# Patient Record
Sex: MALE | Race: WHITE | NOT HISPANIC OR LATINO | URBAN - METROPOLITAN AREA
[De-identification: names, ages, dates, MRNs, and addresses within clinical notes are randomized per-mention and may not be internally consistent; named-entity substitution may affect disease eponyms.]

---

## 2018-04-13 ENCOUNTER — OFFICE VISIT (OUTPATIENT)
Dept: FAMILY MEDICINE | Facility: CLINIC | Age: 28
End: 2018-04-13
Payer: COMMERCIAL

## 2018-04-13 DIAGNOSIS — F64.0 GENDER DYSPHORIA IN ADULT: Primary | ICD-10-CM

## 2018-04-13 LAB
% GRANULOCYTES: 70.5 %G (ref 40–75)
ALBUMIN SERPL-MCNC: 4.8 MG/DL (ref 3.8–5)
ALP SERPL-CCNC: 65.2 U/L (ref 31.7–110.5)
ALT SERPL-CCNC: 16.8 U/L (ref 0–45)
AST SERPL-CCNC: 12.6 U/L (ref 0–45)
BILIRUB SERPL-MCNC: <0.4 MG/DL (ref 0.2–1.3)
BILIRUBIN DIRECT: 0.1 MG/DL (ref 0.1–0.3)
CHOLEST SERPL-MCNC: 169 MG/DL (ref 0–200)
CHOLEST/HDLC SERPL: 2.5 {RATIO} (ref 0–5)
GLUCOSE SERPL-MCNC: 103 MG'DL (ref 70–99)
GRANULOCYTES #: 4.4 K/UL (ref 1.6–8.3)
HCT VFR BLD AUTO: 42 % (ref 35–47)
HDLC SERPL-MCNC: 68.8 MG/DL
HEMOGLOBIN: 13.3 G/DL (ref 11.7–15.7)
LDLC SERPL CALC-MCNC: 93 MG/DL (ref 0–129)
LYMPHOCYTES # BLD AUTO: 1.5 K/UL (ref 0.8–5.3)
LYMPHOCYTES NFR BLD AUTO: 23.4 %L (ref 20–48)
MCH RBC QN AUTO: 31.1 PG (ref 26.5–35)
MCHC RBC AUTO-ENTMCNC: 31.7 G/DL (ref 32–36)
MCV RBC AUTO: 98.1 FL (ref 78–100)
MID #: 0.4 K/UL (ref 0–2.2)
MID %: 6.1 %M (ref 0–20)
PLATELET # BLD AUTO: 337 K/UL (ref 150–450)
PROT SERPL-MCNC: 7.2 G/DL (ref 6.8–8.8)
RBC # BLD AUTO: 4.28 M/UL (ref 3.8–5.2)
TRIGL SERPL-MCNC: 36 MG/DL (ref 0–150)
VLDL CHOLESTEROL: 7.2 MG/DL (ref 7–32)
WBC # BLD AUTO: 6.3 K/UL (ref 4–11)

## 2018-04-13 NOTE — PROGRESS NOTES
"    Transgender History Intake:       ERIC Walker is a 28 year old individual  who presents today for interest in masculinizing hormone therapy to better align their body with their gender identity.  Came to this clinic via referral from: word of mouth, [facility] waitlist too long    Living with a roommate, moved to [facility], [employer] 2017   at [employer], some gym      1-How do you identify? Between nonbinary and transmasculine     On M (0) to F (10) scale, put self about a 6  Not sure how transmasculine he is, but definitely knows he wants to be on testosterone.     2. What pronouns do you prefer?  He/Him/His/Himself   3-What age did you first feel your gender identity (internal sense of gender) did not match your physical body?     Came out as a lesbian around age 15. Stepmother asked at that time if he wanted to be a boy, and he was offended by this. Had a trans roomate in college and would \"check in\" yearly to see if he felt trans. Previously had longer hair, trying to fit in feminine box. Cut hair a few years ago, afraid would look like a boy, or afraid would discover he was trans. Three weeks ago thought to himself \"It would be easier if I was born a boy\", this was somewhat of a alban moment. For the past 3 weeks has come out to almost everyone as Mo (he/him)    4-Have you ever felt depressed or suicidal because your gender identity and body don't match?     Yes. This week has been hard.   Part of struggle has been trying to fit into the lesbian community.   Similar feelings at age 15, had more concrete suicide ideas at that time  No suicidal ideas or plans - just a feeling that if he was dead it would be easier.No history of plans or suicide attempts.     5-Have you legally changed your name? no   If yes: prior name for DIANE:   Gender marker on ID's?   no  6-Have you ever seen a health care provider about being transgender?No  7-What hormones have you been on and for how " long? (These can be treatments you were prescribed, that you got from a friend or bought without a prescription. Include any treatments you currently take.) None  8-Ever had any problems with hormone treatment?  N/A  9-If not on hormones, would you like to be?   maybe  What are your goals for hormone therapy ? Facial hair, body hair, fat redistribution, face contour, voice  10-Have you had any gender affirmation surgery? No  11-Do you want to have surgery now or in future?  YES top surgery for sure, unsure about bottom surgery  12-Are you out at work or at school or at home?      Entire family except grandparents (plans to email them), friends, roommate, instagram, twitter, not FB. 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 none  14-What else we should know about you?  none    ----------      Past Surgical History:   Procedure Laterality Date     ENT SURGERY         There is no problem list on file for this patient.    Past Medical History:   Diagnosis Date     Depressive disorder        Current Outpatient Prescriptions   Medication Sig Dispense Refill     Acetaminophen (TYLENOL PO) Take 325 mg by mouth as needed for mild pain or fever         Family History   Problem Relation Age of Onset     DIABETES Mother      Coronary Artery Disease Father      Breast Cancer Maternal Grandmother      Hypertension No family hx of      Hyperlipidemia No family hx of      CEREBROVASCULAR DISEASE No family hx of        Allergies   Allergen Reactions     Cats      Sneezing and watery eyes     Omnicef [Cefdinir] Hives     SOB. Swelling of face     Penicillins Hives       History   Smoking Status     Never Smoker   Smokeless Tobacco     Never Used       Mental Health Assessment:  Non gender related Therapist? Therapist: [facility] at [facility] in [facility], last 2-3 months - discuss gender and depression  Chemical use history THC use  Mental Health diagnosis  history MDD  Medications  prescribed for above and by whom? meds in high school,  didn't feel like it helped, just felt lethargic            Review of Systems:   CONSTITUTIONAL: NEGATIVE for fever, chills, change in weight  INTEGUMENTARY/SKIN: NEGATIVE for worrisome rashes, moles or lesions  EYES: NEGATIVE for vision changes or irritation  ENT/MOUTH: NEGATIVE for ear, mouth and throat problems  RESP: allergic cough from cat and marijuana (once daily)  BREAST: NEGATIVE for masses, tenderness or discharge  CV: NEGATIVE for chest pain, palpitations or peripheral edema  GI: NEGATIVE for nausea, heartburn, or change in bowel habits.  Stress induced low appetite and abodminal pain  : NEGATIVE for frequency, dysuria, or hematuria. Not seen a GYN in many years, has PP appt next week. Varying degress of discharge, intermittent malodor, no itching  MUSCULOSKELETAL: NEGATIVE for significant arthralgias or myalgia  NEURO: NEGATIVE for weakness, dizziness or paresthesias  ENDOCRINE: NEGATIVE for temperature intolerance, skin/hair changes  HEME/ALLERGY: NEGATIVE for bleeding problems  PSYCHIATRIC: NEGATIVE for changes in mood or affect  Tired all the time - believes it is depression related         Social History     Social History     Social History     Marital status: Single     Spouse name: N/A     Number of children: N/A     Years of education: N/A     Social History Main Topics     Smoking status: Never Smoker     Smokeless tobacco: Never Used     Alcohol use Yes      Comment: occasionally     Drug use: Yes     Special: Marijuana     Sexual activity: Yes     Partners: Female     Birth control/ protection: None     Alcohol <1 per week  No tobacco  Daily marijuana use    Marital Status: Single  Who lives in your household? Roommate Avelino    Only child, all family is in [facility]    Has anyone hurt you physically, for example by pushing, hitting, slapping or kicking you or forcing you to have sex? Denies  Do you feel threatened or controlled by a  "partner, ex-partner or anyone in your life? Denies    Is an only child. Mother/Stepfather live in [place], [place]. Father lives in [place]. When father was previously , he had stepsiblings, but now they are  and he has no contact with them.     Sexual Health     Sexual concerns:  No   History of sexual abuse:  No  STI History:   Neg  Pregnancy History:  No obstetric history on file.  Last Pap Smear :  Believes had a pap, but was a long time ago. [facility]   Abnormal Pap Hx:  None    Sexual health and relationships:  Current sexual partners: girlfriend with vagina     Past sexual partners:    \"All biological women\"  No contact with sperm havers         Physical Exam:     LMP 04/05/2018 (Exact Date) BMI= There is no height or weight on file to calculate BMI.   Appearance: male appearance and dress  GENERAL:: healthy, alert and no distress  RESP: lungs clear to auscultation - no rales, no rhonchi, no wheezes  CV: regular rates and rhythm, normal S1 S2, no S3 or S4 and no murmur, no click or rub -  ABDOMEN: soft, no tenderne  ss, no  hepatosplenomegaly, no masses, normal bowel sounds  Psych: Alert and oriented times 3; coherent speech, normal rate and volume, able to articulate logical thoughts, able to abstract reason, no tangential thoughts, no hallucinations or delusions. Affect is bright. Passive suicidal ideation, no plans, contracts for safety, trans crisis numbers provided    Assessment and Plan   Denise was seen today for establish care.    Diagnoses and all orders for this visit:    Gender dysphoria in adult  -     Testosterone Total  -     CBC with Diff Plt  -     Hepatic Panel  -     Lipid Cascade  -     Glucose       Gender nonbinary  Female sex assigned at birth.  Struggles with leaving lesbian community behind as/if he transitions to be more masculine.  Does not necessarily want to start hormones right away, more in exploratory phase.     Today s visit " included assessment of interventions to alleviate symptoms related to gender dysphoria or gender nonconformity, including     psychological support    medical treatment (hormones or blockers)    options for social support or changes in gender expression.   Hormones can be provided in 3-6 months IF:     Patient able to provide informed consent     Likely to take hormones in a responsible manner    Discussed physical effects, benefits, and risk assessment & modification    Discussed the clinical and biochemical monitoring of hormonal changes and the potential impact on reproductive health (see smiavsconsent)    Stable mental health. Transgender patients are at higher risk of suicide. This patient has been assessed for suicide risk.  Oriented to overall gender assessment and hormone start process, may be 3-6 months before hormones start, need for u0ehlta visits then q3mo, then q6mo    (This assessment is based on the 2011 published Standards of Care for the Health of Transsexual, Transgender, and Gender-Nonconforming People, Version 7, by the World Professional Association of Transgender Health. WPATH SOC Guidelines)    I spent 45 min face to face with the patient and >50% was spent counselling the patient about the above medical conditions, educating patient and discussing the recommendations and followup.    Ema Arechiga MD  [facility], [place]

## 2018-04-13 NOTE — PATIENT INSTRUCTIONS
Here is the plan from today's visit    1. Gender dysphoria in adult  - Testosterone Total  - CBC with Diff Plt  - Hepatic Panel  - Lipid Cascade  - Glucose    Follow up plan  Please make a clinic appointment for follow up with me (EMA MEDEIROS) for ongoing gender and transition visits.     Thank you for coming to [facility] today.  Lab Testing:  **If you had lab testing today and your results are reassuring or normal they will be mailed to you or sent through [facility] within 7 days.   **If the lab tests need quick action we will call you with the results.  The phone number we will call with results is # [phone] (home) . If this is not the best number please call our clinic and change the number.  Medication Refills:  If you need any refills please call your pharmacy and they will contact us.   If you need to  your refill at a new pharmacy, please contact the new pharmacy directly. The new pharmacy will help you get your medications transferred faster.   Scheduling:  If you have any concerns about today's visit or wish to schedule another appointment please call our office during normal business hours [phone] (8-5:00 M-F)  If a referral was made to a [facility] and you don't get a call from central scheduling please call [phone].  If a Mammogram was ordered for you at [facility] call [phone] to schedule or change your appointment.  If you had an XRay/CT/Ultrasound/MRI ordered the number is [phone] to schedule or change your radiology appointment.   Medical Concerns:  If you have urgent medical concerns please call [phone] at any time of the day.    Ema Medeiros MD          Informed Consent   Testosterone Therapy       This form refers to the use of testosterone by persons in the female-to-male spectrum who wish to become more masculine to reduce gender dysphoria and facilitate a more masculine gender presentation. While there are  "risks associated with taking testosterone, when appropriately prescribed it can greatly improve mental health and quality of life.     Please seek another opinion if you want additional perspective on any aspect of your care.       Masculinizing Effects     1. I understand that testosterone may be prescribed to reduce female physical characteristics and masculinize my body.     2. I understand that the masculinizing effects of testosterone can take several months to a year to become noticeable, that the rate and degree of change can't be predicted, and that changes may not be complete for 2-5 years after I start testosterone.     3. I understand that these changes will likely be permanent even if I stop taking testosterone:    ? Lower voice pitch (i.e., voice becoming deeper).   ? Increased growth of hair, with thicker/coarser hairs, on arms, legs, chest, back, and abdomen.   ? Gradual growth of moustache/facial hair.   ? Hair loss at the temples and crown of the head, with the possibility of becoming completely bald.   ? Genital changes may or may not be permanent if testosterone is stopped. These include clitoral growth (typically 1-3 cm) and vaginal dryness.     4. I understand that the following changes are usually not permanent (that is, they will likely reverse if I stop taking testosterone). Although testosterone does not change these features, there are other treatments that may be helpful:    ? Acne, which may be severe and can cause permanent scarring if not treated.   ? Fat may redistribute to a more masculine pattern (decreased on buttocks/hips/thighs, increased in abdomen - changing from \"pear shape\" to \"apple shape\").   ? Increased muscle mass and upper body strength.   ? Increased libido (sex drive).   ? Menstrual periods typically stop within 3-6 months of starting testosterone.   5. I understand that it is not known what the effects of testosterone are on fertility. Fertility is reduced and I may " never be able to get pregnant, even if I stop testosterone. On the other hand, even if my period stops, it may still be possible for me to get pregnant, and I am aware of birth control options (if applicable). I have been informed that I can't take testosterone if I am pregnant.     6. I understand that there are some aspects of my body that will not be changed by testosterone:   ? Breasts may appear slightly smaller due to fat loss, but will not substantially shrink   ? Although voice pitch will likely drop, other aspects of speech will not become more masculine.       Risks of Testosterone     7. I understand that the medical effects and safety of testosterone are not fully understood, and that there may be long-term risks that are not yet known.     8. I understand that I am strongly advised not to take more testosterone than I am prescribed, as this increases health risks. I have been informed that taking more will not make masculinization happen more quickly or increase the degree of change.     9. I understand that testosterone can cause changes that increase my risk of heart disease, including:     ? Decreasing good cholesterol (HDL) and increasing bad cholesterol (LDL)   ? Increasing blood pressure   ? Increasing deposits of fat around my internal organ    I have been advised that my risks of heart disease are greater if people in my family have had heart disease, if I am overweight, or if I smoke.   I have been advised that heart health checkups, including monitoring of my weight and cholesterol levels, should be done periodically as long as I am taking testosterone.     10. I understand that testosterone can damage the liver, possibly leading to liver disease. I have been advised that I should be monitored for as long as I am taking testosterone.     11. I understand that testosterone can increase the red blood cells and hemoglobin, and while the increase is usually only to a normal male range (which does  not pose health risks), a high increase can cause potentially life-threatening problems such as stroke and heart attack. I have been advised that my blood should be monitored periodically while I am taking testosterone.     12. I understand that taking testosterone can increase my risk for diabetes by decreasing my body's response to insulin, causing weight gain, and increasing deposits of fat around my internal organs. I have been advised that my fasting blood glucose should be monitored periodically while I am taking testosterone.       13. I understand that it is not known if testosterone increases the risk of ovarian, breast, or uterine cancer.     14. I understand that taking testosterone can lead to my cervix and the walls of my vagina becoming more fragile, and that this can lead to tears or abrasions that increase the risk of sexually transmitted infections (including HIV) if I have vaginal sex - no matter what the gender of my partner is. I have been advised that phi discussion with my doctor about my sexual practices can help determine how best to prevent and monitor for sexually transmitted infections.     15. I have been informed that testosterone can cause headaches or migraines. I understand that if I am frequently having headaches or migraines, or the pain is unusually severe, it is recommended that I talk with my health care provider.     16. I understand that testosterone can cause emotional changes, including increased irritability, frustration, and anger. I have been advised that my doctor can assist me in finding resources to explore and cope with these changes.     17. I understand that testosterone will result in changes that will be noticeable by other people, and that some transgender people in similar circumstances have experienced harassment, discrimination, and violence, while others have lost support of loved ones. I have been advised that my doctor can assist me in finding advocacy and  support resources.     Prevention of Medical Complications       18. I agree to take testosterone as prescribed and to tell my doctor if I am not happy with the treatment or am experiencing any problems.     19. I understand that the right dose or type of medication prescribed for me may not be the same as for someone else.     20. I understand that physical examinations and blood tests are needed on a regular basis to check for negative side effects of testosterone.     21. I understand that testosterone can interact with other medication (including other sources of hormones), dietary supplements, herbs, alcohol, and street drugs. I understand that being honest with my doctor about what else I am taking will help prevent medical complications that could be life-threatening. I have been informed that I will continue to get medical care no matter what information I share.     22. I understand that some medical conditions make it dangerous to take testosterone. I agree that I will be checked for risky conditions before the decision to take testosterone is made.     23. I understand that I can choose to stop taking testosterone at any time, and that it is advised that I do this with the help of my doctor to make sure there are no negative reactions to stopping. I understand that my doctor may suggest I reduce or stop taking testosterone if there are severe side effects or health risks that can't be controlled.         My signature below confirms that:       ? My doctor has talked with me about the benefits and risks of testosterone, the possible or likely consequences of hormone therapy, and potential alternative treatment options.   ? I understand the risks that may be involved.   ? I understand that this form covers known effects and risks and that there may be long-term effects or risks that are not yet known  ? I have had sufficient opportunity to discuss treatment options with my doctor. All of my questions have  been answered to my satisfaction.   ? I believe I have adequate knowledge on which to base informed consent to the provision of testosterone therapy.     Based on this:   _____ I wish to begin taking testosterone.     _____ I do not wish to begin taking testosterone at this time.         Whatever your current decision is, please talk with your doctor any time you have questions, concerns, or want to re-evaluate your options.         ____________________________________ __________________   Patient Signature     Date           ____________________________________ __________________   Prescribing clinician Signature    Date         Some online resources for transgender health    [place] [facility]gender [facility]   Home to the [facility] at [address] [place] [address], [phone]. Also has support groups.  http://www.[place]transhealth.org/    [facility] for Transgender Health  Increasing access to comprehensive, effective, and affirming healthcare services for trans and gender-variant communities.  http://www.transhealth.[facility].edu/trans?page=dakota-00-05    [facility]   Community-based non-profit committed to advancing the health & wellness of LGBTQ communities through research, education and advocacy. http://www.[facility].org    [facility] Glossary of Transgender Terms  http://www.[facility].org/site/DocServer/Handout_7-C_Glossary_of_Gender_and_Transgender_Terms__fi.pdf?tcbVW=3617    Safe, gender-neutral public restrooms in the [facility]   http://www.[place]transhealth.org//index.php?option=com_content&task=view&id=12&Itemid    Trans Youth Support Network  For people 26 and under who identify as a trans or gender non-conforming person and want to be a part of an activist organization. Offers peer support, education and community building opportunities.   http://www.transyouthsupportnetwork.org/    Reclaim:  RECLAIM offers mental and integrative health services for LGBTQ youth and their  families.  http://www.reclaim-lgbtyouth.org/    Gender Spectrum, for Trans Youth  Gender Spectrum provides education, training and support to help create a gender sensitive and inclusive environment for all children and teens. http://www.genderspectrum.org/    [facility] FTM Resource Guide  Information on topics of interest to female-to-male (FTM, F2M) trans men, and their friends and loved ones.  http://ftmguide.org/    Also check out your local [employer] resource center!  LGBTQIA Services at [facility]: http://www.[facility]/lgbtqia/  LGBTQ@[facility]: http://www.[facility]/multiculturallife/lgbtq/  GLBTA Programs office at [facility]: https://diversity.[facility].edu/glbta/    Effects and Expected Time Course of Masculinizing  Hormones    Effect Expected Onset Expected Maximum Effect   Skin oiliness/Acne 1-6 months 1-2 years   Facial/body hair growth 3-6 months 3-5 years    Scalp hair loss >12 months+ Variable   Increased muscle mass/strength 6-12 months 2-5 years   Body fat redistribution 3-6 months 2-5 years   Cessation of menses 2-6 months n/a   Clitoral enlargement 3-6 months 1-2 years   Vaginal atrophy 3-6 months 1-2 years   Deepened voice 3-12 months 1-2 years           Thoughts of self harm?   Trans Lifeline can be reached at [phone].   This service is staffed by trans people 24/7.   For LGBT youth (ages 24 and younger) contemplating suicide, the [facility] Lifeline can be reached at [phone].

## 2018-04-13 NOTE — MR AVS SNAPSHOT
After Visit Summary   4/13/2018    Denise Lopez    MRN: [id number]           Patient Information     Date Of Birth          1990        Visit Information        Provider Department      4/13/2018 8:00 AM Ema Medeiros MD [facility]        Today's Diagnoses     Gender dysphoria in adult    -  1      Care Instructions      Here is the plan from today's visit    1. Gender dysphoria in adult  - Testosterone Total  - CBC with Diff Plt  - Hepatic Panel  - Lipid Cascade  - Glucose    Follow up plan  Please make a clinic appointment for follow up with me (EMA MEDEIROS) for ongoing gender and transition visits.     Thank you for coming to [facility] today.  Lab Testing:  **If you had lab testing today and your results are reassuring or normal they will be mailed to you or sent through Thryve within 7 days.   **If the lab tests need quick action we will call you with the results.  The phone number we will call with results is # [phone] (home) . If this is not the best number please call our clinic and change the number.  Medication Refills:  If you need any refills please call your pharmacy and they will contact us.   If you need to  your refill at a new pharmacy, please contact the new pharmacy directly. The new pharmacy will help you get your medications transferred faster.   Scheduling:  If you have any concerns about today's visit or wish to schedule another appointment please call our office during normal business hours [phone] (8-5:00 M-F)  If a referral was made to a [facility] and you don't get a call from central scheduling please call [phone].  If a Mammogram was ordered for you at [facility] call [phone] to schedule or change your appointment.  If you had an XRay/CT/Ultrasound/MRI ordered the number is [phone] to schedule or change your radiology appointment.   Medical Concerns:  If you have  urgent medical concerns please call [phone] at any time of the day.    Ema Arechiga MD          Informed Consent   Testosterone Therapy       This form refers to the use of testosterone by persons in the female-to-male spectrum who wish to become more masculine to reduce gender dysphoria and facilitate a more masculine gender presentation. While there are risks associated with taking testosterone, when appropriately prescribed it can greatly improve mental health and quality of life.     Please seek another opinion if you want additional perspective on any aspect of your care.       Masculinizing Effects     1. I understand that testosterone may be prescribed to reduce female physical characteristics and masculinize my body.     2. I understand that the masculinizing effects of testosterone can take several months to a year to become noticeable, that the rate and degree of change can't be predicted, and that changes may not be complete for 2-5 years after I start testosterone.     3. I understand that these changes will likely be permanent even if I stop taking testosterone:    ? Lower voice pitch (i.e., voice becoming deeper).   ? Increased growth of hair, with thicker/coarser hairs, on arms, legs, chest, back, and abdomen.   ? Gradual growth of moustache/facial hair.   ? Hair loss at the temples and crown of the head, with the possibility of becoming completely bald.   ? Genital changes may or may not be permanent if testosterone is stopped. These include clitoral growth (typically 1-3 cm) and vaginal dryness.     4. I understand that the following changes are usually not permanent (that is, they will likely reverse if I stop taking testosterone). Although testosterone does not change these features, there are other treatments that may be helpful:    ? Acne, which may be severe and can cause permanent scarring if not treated.   ? Fat may redistribute to a more masculine pattern (decreased on  "buttocks/hips/thighs, increased in abdomen - changing from \"pear shape\" to \"apple shape\").   ? Increased muscle mass and upper body strength.   ? Increased libido (sex drive).   ? Menstrual periods typically stop within 3-6 months of starting testosterone.   5. I understand that it is not known what the effects of testosterone are on fertility. Fertility is reduced and I may never be able to get pregnant, even if I stop testosterone. On the other hand, even if my period stops, it may still be possible for me to get pregnant, and I am aware of birth control options (if applicable). I have been informed that I can't take testosterone if I am pregnant.     6. I understand that there are some aspects of my body that will not be changed by testosterone:   ? Breasts may appear slightly smaller due to fat loss, but will not substantially shrink   ? Although voice pitch will likely drop, other aspects of speech will not become more masculine.       Risks of Testosterone     7. I understand that the medical effects and safety of testosterone are not fully understood, and that there may be long-term risks that are not yet known.     8. I understand that I am strongly advised not to take more testosterone than I am prescribed, as this increases health risks. I have been informed that taking more will not make masculinization happen more quickly or increase the degree of change.     9. I understand that testosterone can cause changes that increase my risk of heart disease, including:     ? Decreasing good cholesterol (HDL) and increasing bad cholesterol (LDL)   ? Increasing blood pressure   ? Increasing deposits of fat around my internal organ    I have been advised that my risks of heart disease are greater if people in my family have had heart disease, if I am overweight, or if I smoke.   I have been advised that heart health checkups, including monitoring of my weight and cholesterol levels, should be done periodically as " long as I am taking testosterone.     10. I understand that testosterone can damage the liver, possibly leading to liver disease. I have been advised that I should be monitored for as long as I am taking testosterone.     11. I understand that testosterone can increase the red blood cells and hemoglobin, and while the increase is usually only to a normal male range (which does not pose health risks), a high increase can cause potentially life-threatening problems such as stroke and heart attack. I have been advised that my blood should be monitored periodically while I am taking testosterone.     12. I understand that taking testosterone can increase my risk for diabetes by decreasing my body's response to insulin, causing weight gain, and increasing deposits of fat around my internal organs. I have been advised that my fasting blood glucose should be monitored periodically while I am taking testosterone.       13. I understand that it is not known if testosterone increases the risk of ovarian, breast, or uterine cancer.     14. I understand that taking testosterone can lead to my cervix and the walls of my vagina becoming more fragile, and that this can lead to tears or abrasions that increase the risk of sexually transmitted infections (including HIV) if I have vaginal sex - no matter what the gender of my partner is. I have been advised that phi discussion with my doctor about my sexual practices can help determine how best to prevent and monitor for sexually transmitted infections.     15. I have been informed that testosterone can cause headaches or migraines. I understand that if I am frequently having headaches or migraines, or the pain is unusually severe, it is recommended that I talk with my health care provider.     16. I understand that testosterone can cause emotional changes, including increased irritability, frustration, and anger. I have been advised that my doctor can assist me in finding  resources to explore and cope with these changes.     17. I understand that testosterone will result in changes that will be noticeable by other people, and that some transgender people in similar circumstances have experienced harassment, discrimination, and violence, while others have lost support of loved ones. I have been advised that my doctor can assist me in finding advocacy and support resources.     Prevention of Medical Complications       18. I agree to take testosterone as prescribed and to tell my doctor if I am not happy with the treatment or am experiencing any problems.     19. I understand that the right dose or type of medication prescribed for me may not be the same as for someone else.     20. I understand that physical examinations and blood tests are needed on a regular basis to check for negative side effects of testosterone.     21. I understand that testosterone can interact with other medication (including other sources of hormones), dietary supplements, herbs, alcohol, and street drugs. I understand that being honest with my doctor about what else I am taking will help prevent medical complications that could be life-threatening. I have been informed that I will continue to get medical care no matter what information I share.     22. I understand that some medical conditions make it dangerous to take testosterone. I agree that I will be checked for risky conditions before the decision to take testosterone is made.     23. I understand that I can choose to stop taking testosterone at any time, and that it is advised that I do this with the help of my doctor to make sure there are no negative reactions to stopping. I understand that my doctor may suggest I reduce or stop taking testosterone if there are severe side effects or health risks that can't be controlled.         My signature below confirms that:       ? My doctor has talked with me about the benefits and risks of testosterone, the  possible or likely consequences of hormone therapy, and potential alternative treatment options.   ? I understand the risks that may be involved.   ? I understand that this form covers known effects and risks and that there may be long-term effects or risks that are not yet known  ? I have had sufficient opportunity to discuss treatment options with my doctor. All of my questions have been answered to my satisfaction.   ? I believe I have adequate knowledge on which to base informed consent to the provision of testosterone therapy.     Based on this:   _____ I wish to begin taking testosterone.     _____ I do not wish to begin taking testosterone at this time.         Whatever your current decision is, please talk with your doctor any time you have questions, concerns, or want to re-evaluate your options.         ____________________________________ __________________   Patient Signature     Date           ____________________________________ __________________   Prescribing clinician Signature    Date         Some online resources for transgender health    [place] Transgender Health Coalition   Home to the [facility] at [address] [place] [address], [phone]. Also has support groups.  http://www.[place]transhealth.org/    [facility] for Transgender Health  Increasing access to comprehensive, effective, and affirming healthcare services for trans and gender-variant communities.  http://www.transhealth.[facility].edu/trans?page=dakota-00-05    [facility]   Community-based non-profit committed to advancing the health & wellness of LGBTQ communities through research, education and advocacy. http://www.[employer]Mixhealth.org    [facility] Glossary of Transgender Terms  http://www.[facility].org/site/DocServer/Handout_7-C_Glossary_of_Gender_and_Transgender_Terms__fi.pdf?eyhPV=9414    Safe, gender-neutral public restrooms in the [facility]    http://www.[place]transhealth.org//index.php?option=com_content&task=view&id=12&Itemid    Trans Youth Support Network  For people 26 and under who identify as a trans or gender non-conforming person and want to be a part of an activist organization. Offers peer support, education and community building opportunities.   http://www.transyouthsupportnetwork.org/    Reclaim:  [facility] offers mental and integrative health services for LGBTQ youth and their families.  http://www.reclaim-lgbtyouth.org/    Gender Spectrum, for Trans Youth  Gender Spectrum provides education, training and support to help create a gender sensitive and inclusive environment for all children and teens. http://www.genderspectrum.org/    [employer] FTM Resource Guide  Information on topics of interest to female-to-male (FTM, F2M) trans men, and their friends and loved ones.  http://ftTrice Imaginguide.org/    Also check out your local [employer] resource center!  LGBTQIA Services at [facility]: http://www.[facility]/lgbtqia/  LGBTQ@Mac at [facility]: http://www.[facility]/multiculturallife/lgbtq/  GLBTA Programs office at [facility]: https://diversity.[facility].edu/glbta/    Effects and Expected Time Course of Masculinizing  Hormones    Effect Expected Onset Expected Maximum Effect   Skin oiliness/Acne 1-6 months 1-2 years   Facial/body hair growth 3-6 months 3-5 years    Scalp hair loss >12 months+ Variable   Increased muscle mass/strength 6-12 months 2-5 years   Body fat redistribution 3-6 months 2-5 years   Cessation of menses 2-6 months n/a   Clitoral enlargement 3-6 months 1-2 years   Vaginal atrophy 3-6 months 1-2 years   Deepened voice 3-12 months 1-2 years           Thoughts of self harm?   Trans Lifeline can be reached at [phone].   This service is staffed by trans people 24/7.   For LGBT youth (ages 24 and younger) contemplating suicide, the [employer] [facility] Lifeline can be reached at [phone].               Follow-ups after your visit        Who to  contact     Please call your clinic at [phone] to:    Ask questions about your health    Make or cancel appointments    Discuss your medicines    Learn about your test results    Speak to your doctor            Additional Information About Your Visit        JobSlothart Information     Lalalama is an electronic gateway that provides easy, online access to your medical records. With Lalalama, you can request a clinic appointment, read your test results, renew a prescription or communicate with your care team.     To sign up for Lalalama visit the website at [email or web address]   You will be asked to enter the access code listed below, as well as some personal information. Please follow the directions to create your username and password.     Your access code is: [email or web address]  Expires: 2018  8:52 AM     Your access code will  in 90 days. If you need help or a new code, please contact your [facility] or call [phone] for assistance.        Care EveryWhere ID     This is your Care EveryWhere ID. This could be used by other organizations to access your [place] medical records  [id number]        Your Vitals Were     Last Period                   2018 (Exact Date)            Blood Pressure from Last 3 Encounters:   No data found for BP    Weight from Last 3 Encounters:   No data found for Wt              We Performed the Following     CBC with Diff Plt     Glucose     Hepatic Panel     Lipid Cascade     Testosterone Total        Primary Care Provider Fax #    Physician No Ref-Primary [phone]       No address on file        Equal Access to Services     JOSELIN MENJIVAR : Hadii maryana Fonseca, waaxda newadaha, qaybta kaalmada susie, sienna brito . So [facility] [place] [phone].    ATENCIÓN: Si habla español, tiene a contreras disposición servicios gratuitos de asistencia lingüística. Llame al [phone].    We comply with applicable  federal civil rights laws and [place] laws. We do not discriminate on the basis of race, color, national origin, age, disability, sex, sexual orientation, or gender identity.            Thank you!     Thank you for choosing [facility]  for your care. Our goal is always to provide you with excellent care. Hearing back from our patients is one way we can continue to improve our services. Please take a few minutes to complete the written survey that you may receive in the mail after your visit with us. Thank you!             Your Updated Medication List - Protect others around you: Learn how to safely use, store and throw away your medicines at www.disposemymeds.org.          This list is accurate as of 4/13/18  8:52 AM.  Always use your most recent med list.                   Brand Name Dispense Instructions for use Diagnosis    TYLENOL PO      Take 325 mg by mouth as needed for mild pain or fever

## 2018-04-14 ENCOUNTER — HEALTH MAINTENANCE LETTER (OUTPATIENT)
Age: 28
End: 2018-04-14

## 2018-04-14 ASSESSMENT — PATIENT HEALTH QUESTIONNAIRE - PHQ9: SUM OF ALL RESPONSES TO PHQ QUESTIONS 1-9: 18

## 2018-04-17 ENCOUNTER — OFFICE VISIT (OUTPATIENT)
Dept: FAMILY MEDICINE | Facility: CLINIC | Age: 28
End: 2018-04-17
Payer: COMMERCIAL

## 2018-04-17 VITALS
SYSTOLIC BLOOD PRESSURE: 117 MMHG | DIASTOLIC BLOOD PRESSURE: 76 MMHG | WEIGHT: 134.8 LBS | TEMPERATURE: 98 F | OXYGEN SATURATION: 97 % | HEART RATE: 99 BPM

## 2018-04-17 DIAGNOSIS — F33.1 MODERATE EPISODE OF RECURRENT MAJOR DEPRESSIVE DISORDER (H): Primary | ICD-10-CM

## 2018-04-17 DIAGNOSIS — F64.0 GENDER DYSPHORIA IN ADULT: ICD-10-CM

## 2018-04-17 DIAGNOSIS — F41.1 GAD (GENERALIZED ANXIETY DISORDER): ICD-10-CM

## 2018-04-17 LAB
HIV1 P24 AG SER QL: NORMAL
HIV1+2 AB SERPL QL IA: NEGATIVE
RAPID HIV INTERNAL CONTROL: NORMAL
RAPID HIV INTERPRETATION: NORMAL
TESTOST SERPL-MCNC: 42 NG/DL (ref 8–60)

## 2018-04-17 ASSESSMENT — ANXIETY QUESTIONNAIRES
IF YOU CHECKED OFF ANY PROBLEMS ON THIS QUESTIONNAIRE, HOW DIFFICULT HAVE THESE PROBLEMS MADE IT FOR YOU TO DO YOUR WORK, TAKE CARE OF THINGS AT HOME, OR GET ALONG WITH OTHER PEOPLE: VERY DIFFICULT
6. BECOMING EASILY ANNOYED OR IRRITABLE: MORE THAN HALF THE DAYS
3. WORRYING TOO MUCH ABOUT DIFFERENT THINGS: NEARLY EVERY DAY
GAD7 TOTAL SCORE: 14
2. NOT BEING ABLE TO STOP OR CONTROL WORRYING: NEARLY EVERY DAY
1. FEELING NERVOUS, ANXIOUS, OR ON EDGE: MORE THAN HALF THE DAYS
5. BEING SO RESTLESS THAT IT IS HARD TO SIT STILL: SEVERAL DAYS
7. FEELING AFRAID AS IF SOMETHING AWFUL MIGHT HAPPEN: SEVERAL DAYS

## 2018-04-17 ASSESSMENT — PATIENT HEALTH QUESTIONNAIRE - PHQ9: 5. POOR APPETITE OR OVEREATING: MORE THAN HALF THE DAYS

## 2018-04-17 NOTE — PATIENT INSTRUCTIONS
Here is the plan from today's visit    1. Moderate episode of recurrent major depressive disorder (H)  2. TODD (generalized anxiety disorder)  Start:  - sertraline (ZOLOFT) 50 MG tablet; Take 1/2 tablet (25 mg) for 1 week, then increase to 1 tablet orally daily  Dispense: 30 tablet; Refill: 3  Follow up with either clinic visit or anywayanyday message in 3 weeks, likely dose increase to 100mg at that time  Omega 3 fatty acids - fish oil  Exercise    Clinic visit within the next 2 months    Thank you for coming to [facility] today.  Lab Testing:  **If you had lab testing today and your results are reassuring or normal they will be mailed to you or sent through anywayanyday within 7 days.   **If the lab tests need quick action we will call you with the results.  The phone number we will call with results is # [phone] (home) . If this is not the best number please call our clinic and change the number.  Medication Refills:  If you need any refills please call your pharmacy and they will contact us.   If you need to  your refill at a new pharmacy, please contact the new pharmacy directly. The new pharmacy will help you get your medications transferred faster.   Scheduling:  If you have any concerns about today's visit or wish to schedule another appointment please call our office during normal business hours [phone] (8-5:00 M-F)  If a referral was made to a [facility] and you don't get a call from central scheduling please call [phone].  If a Mammogram was ordered for you at [facility] call [phone] to schedule or change your appointment.  If you had an XRay/CT/Ultrasound/MRI ordered the number is [phone] to schedule or change your radiology appointment.   Medical Concerns:  If you have urgent medical concerns please call [phone] at any time of the day.    Ema Arechiga MD

## 2018-04-17 NOTE — PROGRESS NOTES
"      HPI:       Mo (he/him) is a 28 year old who presents to discuss starting depression medication.     Mo was seen initially on 4/13/2019 to begin discussion of hormones for transitioning from female to male. Today he tells me that at some point wants to start hormones, but wants to figure out mental stuff first.     Depression & Anxiety  Depression and anxiety \"tag team\". Has concerns about feeling better depression-wise but then anxiety worsening. Would like to take at night due to \"sensitive stomach\". At baseline, does not sweat.   zoloft for <1 year when age 16 for headaches, when stopped, experienced a lot more depression. Doesn't remember having any bad side effects. On an antidepressant at some point that made him \"not feel anything\".  FHx: mom and dad have alcoholism and depression    Sleep: well, every day wakes up not feeling rested. Normally smokes pot before bed to help sleep, if doesn't smoke pot, then takes a little longer to fall asleep  Energy: low most days  Anhedonia: endorses. doesn't enjoy reading or watching TV. Still enjoys volunteering (outfront), but often lacks motivation to go.   Difficulty concentrating: endorses. Can't focus on just watching TV  Guilt: endorses. 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 Restorationist upbringing  Psychomotor changes: no  Suicidality: thoughts that would be better off dead because it would be easier. No plans, no intent. Really bad over the weekend, can't remember all of it now. Went to bed feeling horrible, would wake up and feeling unchanged.     Bipolar screen  Needs 7-8 hours of sleep to feel rested has had episodes of low sleep (<5h) but has had more energy - goes to work, cognitively not at top of his game. Denies any increased impulsivity during these times such as gambling or shopping.  This would last with 1 day    Anxiety  Doing new things causes most of his anxiety.  No specific fears or phobias.  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  Previous use was 5pm-9:30pm. Has cut down with roommate using time lock box - use only 7pm-9:30.  Has plans next month to cut down to every other day use. THC sometimes makes anxiety better, sometimes worse. Temporary relief from depression most of the time.   Very occasional alcohol use.  Not a tobacco smoker  Drug use.    PHQ-9 SCORE 4/13/2018 4/17/2018   Total Score 18 18     TODD-7 SCORE 4/17/2018   Total Score 14     Problem, Medication and Allergy Lists were reviewed and are current.  Patient is an established patient of this clinic.         Review of Systems:   Review of Systems          Physical Exam:   Patient Vitals for the past 24 hrs:   BP Temp Temp src Pulse SpO2 Weight   04/17/18 1348 117/76 98  F (36.7  C) Oral 99 97 % 134 lb 12.8 oz (61.1 kg)     There is no height or weight on file to calculate BMI.   (note: incorrent pronounsin physical exam, should be \"he\")  Physical Exam   Constitutional: She appears well-developed and well-nourished. She appears distressed.   HENT:   Head: Normocephalic and atraumatic.   Eyes: Conjunctivae are normal.   Cardiovascular: Normal rate.    Pulmonary/Chest: Effort normal. No respiratory distress.   Skin: Skin is warm and dry. No rash noted. No erythema. No pallor.   Psychiatric: Her speech is normal and behavior is normal. Judgment normal. Her mood appears anxious. Thought content is not paranoid and not delusional. Cognition and memory are normal. She exhibits a depressed mood. She expresses no suicidal plans and no homicidal plans.   Vitals reviewed.        Results:     Results from last visit:  Office Visit on 04/13/2018   Component Date Value Ref " Range Status     Testosterone Total 04/13/2018 42  8 - 60 ng/dL Final    Comment: This test was developed and its performance characteristics determined by the   [facility],  [facility]. It has   not been cleared or approved by the FDA. The laboratory is regulated under   CLIA as qualified to perform high-complexity testing. This test is used for   clinical purposes. It should not be regarded as investigational or for   research.       WBC 04/13/2018 6.3  4.0 - 11.0 K/uL Final     Lymphocytes # 04/13/2018 1.5  0.8 - 5.3 K/uL Final     % Lymphocytes 04/13/2018 23.4  20.0 - 48.0 %L Final     Mid # 04/13/2018 0.4  0.0 - 2.2 K/uL Final     Mid % 04/13/2018 6.1  0.0 - 20.0 %M Final     GRANULOCYTES # 04/13/2018 4.4  1.6 - 8.3 K/uL Final     % Granulocytes 04/13/2018 70.5  40.0 - 75.0 %G Final     RBC 04/13/2018 4.28  3.80 - 5.20 M/uL Final     Hemoglobin 04/13/2018 13.3  11.7 - 15.7 g/dL Final     Hematocrit 04/13/2018 42.0  35.0 - 47.0 % Final     MCV 04/13/2018 98.1  78.0 - 100.0 fL Final     MCH 04/13/2018 31.1  26.5 - 35.0 pg Final     MCHC 04/13/2018 31.7* 32.0 - 36.0 g/dL Final     Platelets 04/13/2018 337.0  150.0 - 450.0 K/uL Final     Albumin 04/13/2018 4.8  3.8 - 5.0 mg/dL Final     Alkaline Phosphatase 04/13/2018 65.2  31.7 - 110.5 U/L Final     ALT 04/13/2018 16.8  0.0 - 45.0 U/L Final     AST 04/13/2018 12.6  0.0 - 45.0 U/L Final     Bilirubin Direct 04/13/2018 0.1  0.1 - 0.3 mg/dL Final     Bilirubin Total 04/13/2018 <0.4  0.2 - 1.3 mg/dL Final     Protein Total 04/13/2018 7.2  6.8 - 8.8 g/dL Final     Cholesterol 04/13/2018 169.0  0.0 - 200.0 mg/dL Final     Cholesterol/HDL Ratio 04/13/2018 2.5  0.0 - 5.0 Final     HDL Cholesterol 04/13/2018 68.8  >40.0 mg/dL Final     LDL Cholesterol Calculated 04/13/2018 93  0 - 129 mg/dL Final     Triglycerides 04/13/2018 36.0  0.0 - 150.0 mg/dL Final     VLDL Cholesterol 04/13/2018 7.2  7.0 - 32.0 mg/dL Final     Glucose  04/13/2018 103.0* 70.0 - 99.0 mg'dL Final     Assessment and Plan     Patient Instructions   Here is the plan from today's visit    1. Moderate episode of recurrent major depressive disorder (H)  2. TODD (generalized anxiety disorder)  Grand saw amador for major depressive disorder and generalized anxiety disorder, clearly has been struggling with this for quite a while.  Negative bipolar screening.  Also tolerated SSRI before without unmasking manic symptoms. Has been using marijuana to temporarily alleviate symptoms, we did discuss that this continuing to cut back will probably be beneficial for his mental health, especially in the setting of adding an SSRI.  He endorses passive suicidal ideation, with no plans, no previous attempts.  He contracts for safety and when prompted today states that he still has the suicide trans crisis number that I provided at last visit, I encouraged him to put the number in his phone as well.  His therapist is [facility] in [facility], he reports a good relationship with her and plans to continue therapy there weekly.  He also has a good support network locally, his roommate, his friends, and is established in the [facility] community.   Start:  - sertraline (ZOLOFT) 50 MG tablet; Take 1/2 tablet (25 mg) for 1 week, then increase to 1 tablet orally daily  Dispense: 30 tablet; Refill: 3  Follow up with either clinic visit or [employer] message in 3 weeks, likely dose increase to 100mg at that time  Omega 3 fatty acids - fish oil  Exercise    Clinic visit within the next 2 months    There are no discontinued medications.  Options for treatment and follow-up care were reviewed with the patient. Denise Lopez  engaged in the decision making process and verbalized understanding of the options discussed and agreed with the final plan.    I spent 25 min face to face with the patient and >50% was spent counselling the patient about the above medical conditions, educating patient and  discussing the recommendations and followup .    Ema Arechiga MD   of [facility], [facility]

## 2018-04-17 NOTE — MR AVS SNAPSHOT
After Visit Summary   4/17/2018    Denise Lopez    MRN: [id number]           Patient Information     Date Of Birth          1990        Visit Information        Provider Department      4/17/2018 2:00 PM Ema Arechiga MD [facility]        Today's Diagnoses     Moderate episode of recurrent major depressive disorder (H)    -  1    TODD (generalized anxiety disorder)        Gender dysphoria in adult          Care Instructions    Here is the plan from today's visit    1. Moderate episode of recurrent major depressive disorder (H)  2. TODD (generalized anxiety disorder)  Start:  - sertraline (ZOLOFT) 50 MG tablet; Take 1/2 tablet (25 mg) for 1 week, then increase to 1 tablet orally daily  Dispense: 30 tablet; Refill: 3  Follow up with either clinic visit or [employer] message in 3 weeks, likely dose increase to 100mg at that time  Omega 3 fatty acids - fish oil  Exercise    Clinic visit within the next 2 months    Thank you for coming to [facility] today.  Lab Testing:  **If you had lab testing today and your results are reassuring or normal they will be mailed to you or sent through [employer] within 7 days.   **If the lab tests need quick action we will call you with the results.  The phone number we will call with results is # [phone] (home) . If this is not the best number please call our clinic and change the number.  Medication Refills:  If you need any refills please call your pharmacy and they will contact us.   If you need to  your refill at a new pharmacy, please contact the new pharmacy directly. The new pharmacy will help you get your medications transferred faster.   Scheduling:  If you have any concerns about today's visit or wish to schedule another appointment please call our office during normal business hours [phone] (8-5:00 M-F)  If a referral was made to a [facility] and you don't get a call from [facility]  scheduling please call [phone].  If a Mammogram was ordered for you at [facility] call [phone] to schedule or change your appointment.  If you had an XRay/CT/Ultrasound/MRI ordered the number is [phone] to schedule or change your radiology appointment.   Medical Concerns:  If you have urgent medical concerns please call [phone] at any time of the day.    Ema Arechiga MD            Follow-ups after your visit        Who to contact     Please call your clinic at [phone] to:    Ask questions about your health    Make or cancel appointments    Discuss your medicines    Learn about your test results    Speak to your doctor            Additional Information About Your Visit        [employer] Information     [employer] gives you secure access to your electronic health record. If you see a primary care provider, you can also send messages to your care team and make appointments. If you have questions, please call your primary care clinic.  If you do not have a primary care provider, please call [phone] and they will assist you.      [employer] is an electronic gateway that provides easy, online access to your medical records. With [employer], you can request a clinic appointment, read your test results, renew a prescription or communicate with your care team.     To access your existing account, please contact your [facility] or call [phone] for assistance.        Care EveryWhere ID     This is your Care EveryWhere ID. This could be used by other organizations to access your [place] medical records  [id number]        Your Vitals Were     Pulse Temperature Last Period Pulse Oximetry Breastfeeding?       99 98  F (36.7  C) (Oral) 04/05/2018 (Exact Date) 97% No        Blood Pressure from Last 3 Encounters:   04/17/18 117/76    Weight from Last 3 Encounters:   04/17/18 134 lb 12.8 oz (61.1 kg)              Today, you had the following     No orders found for  display         Today's Medication Changes          These changes are accurate as of 4/17/18  2:19 PM.  If you have any questions, ask your nurse or doctor.               Start taking these medicines.        Dose/Directions    sertraline 50 MG tablet   Commonly known as:  ZOLOFT   Used for:  Moderate episode of recurrent major depressive disorder (H)   Started by:  Ema Arechiga MD        Take 1/2 tablet (25 mg) for 1 week, then increase to 1 tablet orally daily   Quantity:  30 tablet   Refills:  3            Where to get your medicines      These medications were sent to [facility] [address] [place] [address] AT [address] [address]    Hours:  24-hours Phone:  [phone]     sertraline 50 MG tablet                Primary Care Provider Fax #    Physician No Ref-Primary [phone]       No address on file        Equal Access to Services     JOSELIN MENJIVAR : Hadching huff Sosanket, waaxda luqadaha, qaybta kaalmada susie, sienna brito . So [facility] [phone].    ATENCIÓN: Si habla español, tiene a contreras disposición servicios gratuitos de asistencia lingüística. Tash al [phone].    We comply with applicable federal civil rights laws and [place] laws. We do not discriminate on the basis of race, color, national origin, age, disability, sex, sexual orientation, or gender identity.            Thank you!     Thank you for choosing [facility]  for your care. Our goal is always to provide you with excellent care. Hearing back from our patients is one way we can continue to improve our services. Please take a few minutes to complete the written survey that you may receive in the mail after your visit with us. Thank you!             Your Updated Medication List - Protect others around you: Learn how to safely use, store and throw away your medicines at www.disposemymeds.org.          This list is  accurate as of 4/17/18  2:19 PM.  Always use your most recent med list.                   Brand Name Dispense Instructions for use Diagnosis    sertraline 50 MG tablet    ZOLOFT    30 tablet    Take 1/2 tablet (25 mg) for 1 week, then increase to 1 tablet orally daily    Moderate episode of recurrent major depressive disorder (H)       TYLENOL PO      Take 325 mg by mouth as needed for mild pain or fever

## 2018-04-18 ASSESSMENT — ANXIETY QUESTIONNAIRES: GAD7 TOTAL SCORE: 14

## 2018-04-18 ASSESSMENT — PATIENT HEALTH QUESTIONNAIRE - PHQ9: SUM OF ALL RESPONSES TO PHQ QUESTIONS 1-9: 18

## 2018-04-19 NOTE — PROGRESS NOTES
The results from your recent testing are normal. Normal and reassuring baseline labs, nothing to prevent moving forward with hormones if/when you want. If you have any further questions feel free to contact me via [employer] message.     Sincerely,   Ema Arechiga MD

## 2018-05-01 PROBLEM — Z00.00 HEALTHCARE MAINTENANCE: Status: ACTIVE | Noted: 2018-05-01

## 2018-05-14 ENCOUNTER — ALLIED HEALTH/NURSE VISIT (OUTPATIENT)
Dept: FAMILY MEDICINE | Facility: CLINIC | Age: 28
End: 2018-05-14
Payer: COMMERCIAL

## 2018-05-14 ENCOUNTER — TELEPHONE (OUTPATIENT)
Dept: FAMILY MEDICINE | Facility: CLINIC | Age: 28
End: 2018-05-14

## 2018-05-14 ENCOUNTER — OFFICE VISIT (OUTPATIENT)
Dept: FAMILY MEDICINE | Facility: CLINIC | Age: 28
End: 2018-05-14
Payer: COMMERCIAL

## 2018-05-14 VITALS
OXYGEN SATURATION: 98 % | WEIGHT: 133.4 LBS | DIASTOLIC BLOOD PRESSURE: 75 MMHG | HEART RATE: 76 BPM | TEMPERATURE: 98.1 F | SYSTOLIC BLOOD PRESSURE: 121 MMHG

## 2018-05-14 DIAGNOSIS — F64.0 GENDER DYSPHORIA IN ADULT: Primary | ICD-10-CM

## 2018-05-14 RX ORDER — TESTOSTERONE CYPIONATE 200 MG/ML
40 INJECTION, SOLUTION INTRAMUSCULAR WEEKLY
Qty: 2 ML | Refills: 2 | Status: SHIPPED | OUTPATIENT
Start: 2018-05-14 | End: 2018-08-20

## 2018-05-14 ASSESSMENT — ANXIETY QUESTIONNAIRES
1. FEELING NERVOUS, ANXIOUS, OR ON EDGE: NOT AT ALL
3. WORRYING TOO MUCH ABOUT DIFFERENT THINGS: NOT AT ALL
6. BECOMING EASILY ANNOYED OR IRRITABLE: SEVERAL DAYS
7. FEELING AFRAID AS IF SOMETHING AWFUL MIGHT HAPPEN: NOT AT ALL
5. BEING SO RESTLESS THAT IT IS HARD TO SIT STILL: SEVERAL DAYS
IF YOU CHECKED OFF ANY PROBLEMS ON THIS QUESTIONNAIRE, HOW DIFFICULT HAVE THESE PROBLEMS MADE IT FOR YOU TO DO YOUR WORK, TAKE CARE OF THINGS AT HOME, OR GET ALONG WITH OTHER PEOPLE: NOT DIFFICULT AT ALL
GAD7 TOTAL SCORE: 2
2. NOT BEING ABLE TO STOP OR CONTROL WORRYING: NOT AT ALL

## 2018-05-14 ASSESSMENT — PATIENT HEALTH QUESTIONNAIRE - PHQ9: 5. POOR APPETITE OR OVEREATING: NOT AT ALL

## 2018-05-14 NOTE — MR AVS SNAPSHOT
"              After Visit Summary   5/14/2018    Denise Lopez    MRN: [id number]           Patient Information     Date Of Birth          1990        Visit Information        Provider Department      5/14/2018 9:40 AM Ema Medeiros MD [facility]        Today's Diagnoses     Gender dysphoria in adult    -  1      Care Instructions    Here is the plan from today's visit    Depression  Continue zoloft 50mg, room to go up to 75 or 100 if needed in the future    1. Gender dysphoria in adult  SHOT TEACHING - RN at [facility], or shot clinic  - Needle, Disp, 25G X 1\" MISC; Use to administer hormone IM once weekly  Dispense: 50 each; Refill: 3  - testosterone cypionate (DEPOTESTOTERONE) 200 MG/ML injection; Inject 0.2 mLs (40 mg) into the muscle once a week  Dispense: 2 mL; Refill: 2  - needle, disp, 18G X 1\" MISC; Use to draw up hormones once weekly  Dispense: 25 each; Refill: 3  - syringe, disposable, 1 ML MISC; Use once weekly to draw up hormones  Dispense: 25 each; Refill: 3      Follow up plan  Please make a clinic appointment for follow up with me (EMA MEDEIROS) in 1  month for hormone follow up.      Patient Education Information Sheet   [facility]      You Will Be Able To:  1. Self inject your medication accurately  2. Receive the injection in the comfort of your own home    How Do I Get My Medication?      We will enter a prescription for your medication and supplies. They can be picked up at the pharmacy or mailed to your home by the pharmacy. It may come in a 1 ml or 10ml size.     1ml=1cc           10ml is 2 teaspoons    How Do I Store My Medication?  Testosterone and estrogen can be stored at room temperature.   USP ([place] Pharmacopeia) recommends you dispose of the vial after 28 days. Many patients keep it for longer, but how long the medicine stays effective after exposure to air and light is uncertain. Definitely if its many months old, " consider discarding it and getting a new vial.    Special Note for Testosterone and Estrogen Injections:    Hormones are in an oil which is thick & can be hard to draw into the syringe.    You will receive two needles for each hormone injection.     One needle is larger and used to draw the medication into the syringe (18g)     The other is thinner and used for the injection (22 or 23g)    Equipment Needed  1. Medication Vial   2. Syringe   3. 2 Needles  4. Alcohol swab or Alcohol and cotton balls  5. Band-aid if needed  6. Sharps Disposal - can use laundry detergent tub             Parts of a Syringe      Intramuscular Injection (IM)  The needle passes through the skin and subcutaneous fatty tissue then medication is injected directly into the muscle. Push the syringe straight in, all the way to the hub. Then depress the plunger, waiting a few seconds for the medication to enter the muscle. Then remove the syringe and dispose of it.        Sites for IM Injection (thigh)  Sites for IM Injection (Hip)               Steps for Intramuscular Injection   ? Gather Supplies (Double check for correct medication and dosage).  ? Wash Hands  ? Choose a site for the injection: Sites include: thigh or buttock  ? Draw up medication into syringe (don t forget to push out excess air with plunger). You will have two needles. Use the larger needle to draw up the medicine. Then, change the needle to the thinner one for the injection. Carefully recap needle and set syringe aside.  ? Wipe injection site with alcohol swab  ? Relax the muscle. Pinch a piece of skin and tissue to inject into  ? Dart the needle through the skin at a 90 degree angle   ? Take hold of syringe with free hand to stabilize   ? Carefully pull back on the plunger slightly (aspirate) and check for blood in the syringe  ? If there is no blood, push plunger to inject medication  ? If there is blood, remove the needle, throw away and start over  ? After injecting the  medication, remove the needle and place in sharps container  ? Use a band-aid to cover the site if desired      Intramuscular Injection Safety Tips  1. Double check for correct medication and dosage  2. Dart the needle in quickly and to the hub  3. Remember to aspirate (remove needle if you see blood, discard and start over)  4. Dispose of  used needle and syringe right away in proper sharps container     You Can Do It!  Once you learn the skills, you will be a pro in no time!  If you are having trouble, consider coming to clinic, or going to the shot clinic at [place] [facility], [address], [phone]  http://www.[facility].org/        Thank you for coming to [facility] today.  Lab Testing:  **If you had lab testing today and your results are reassuring or normal they will be mailed to you or sent through [facility] within 7 days.   **If the lab tests need quick action we will call you with the results.  The phone number we will call with results is # [phone] (home) . If this is not the best number please call our clinic and change the number.  Medication Refills:  If you need any refills please call your pharmacy and they will contact us.   If you need to  your refill at a new pharmacy, please contact the new pharmacy directly. The new pharmacy will help you get your medications transferred faster.   Scheduling:  If you have any concerns about today's visit or wish to schedule another appointment please call our office during normal business hours [phone] (8-5:00 M-F)  If a referral was made to a [facility] and you don't get a call from central scheduling please call [phone].  If a Mammogram was ordered for you at [facility] call [phone] to schedule or change your appointment.  If you had an XRay/CT/Ultrasound/MRI ordered the number is [phone] to schedule or change your radiology appointment.   Medical  Concerns:  If you have urgent medical concerns please call [phone] at any time of the day.    Ema Arechiga MD      Some online resources for transgender health    [place] [facility]   Home to the Shot Clinic at [address] [facility] [facility], [phone]. Also has support groups.  http://www.[place].org/    [facility]gender [facility]  Increasing access to comprehensive, effective, and affirming healthcare services for trans and gender-variant communities.  http://www.transhealth.[facility].edu/trans?page=dakota-00-05    [facility]   Community-based non-profit committed to advancing the health & wellness of LGBTQ communities through research, education and advocacy. http://www.[employer].org    [facility] Glossary of Transgender Terms  http://www.[employer].org/site/DocServer/Handout_7-C_Glossary_of_Gender_and_Transgender_Terms__fi.pdf?lspJZ=5254    Safe, gender-neutral public restrooms in [facility]   http://www.[facility].org//index.php?option=com_content&task=view&id=12&Itemid    Trans Youth Support Network  For people 26 and under who identify as a trans or gender non-conforming person and want to be a part of an activist organization. Offers peer support, education and community building opportunities.   http://www.transyouthsupportnetwork.org/    Reclaim:  RECLAIM offers mental and integrative health services for LGBTQ youth and their families.  http://www.reclaim-lgbtyouth.org/    Gender Spectrum, for Trans Youth  Gender Spectrum provides education, training and support to help create a gender sensitive and inclusive environment for all children and teens. http://www.genderspectrum.org/    [employer] FTM Resource Guide  Information on topics of interest to female-to-male (FTM, F2M) trans men, and their friends and loved ones.  http://ftmguide.org/    Also check out your local [employer] queer resource center!  LGBTQIA Services at [facility]:  http://www.[facility]/lgbtqia/  LGBTQ@Mac at [facility]: http://www.[facility]/multiculturallife/lgbtq/  GLBTA Programs office at [facility]: https://diversity.[facility]/glbta/          Patient Education Information Sheet   [facility]      You Will Be Able To:    1.  Self inject your medication accurately  2.  Receive the injection in the comfort of your own home    How Do I Get My Medication?    We will enter a prescription for your medication and supplies. They can be picked up at the pharmacy or mailed to your home by the pharmacy. It may come in a 1 ml or 10ml size.        1ml=1cc             10ml is 2 teaspoons    How Do I Store My Medication?    Testosterone and estrogen can be stored at room temperature.   USP ([place] Pharmacopeia) recommends you dispose of the vial after 28 days. Many patients keep it for longer, but how long the medicine stays effective after exposure to air and light is uncertain. Definitely if its many months old, consider discarding it and getting a new vial.    Special Note for Testosterone and Estrogen Injections:      Hormones are in an oil which is thick & can be hard to draw into the syringe.    You will receive two needles for each hormone injection.     One needle is larger and used to draw the medication into the syringe (18g)     The other is thinner and used for the injection (22 or 23g)    Equipment Needed:    1. Medication Vial   2. Syringe   3. 2 Needles  4. Alcohol swab or Alcohol and cotton balls  5. Band-aid if needed  6. Sharps Disposal - can use laundry detergent tub       Parts of a Syringe    Intramuscular Injection (IM)  The needle passes through the skin and subcutaneous fatty tissue then medication is injected directly into the muscle. Push the syringe straight in, all the way to the hub. Then depress the plunger, waiting a few seconds for the medication to enter the muscle. Then remove the syringe and dispose of it.    Sites for IM  Injection (thigh)      Steps for Intramuscular Injection:    1. Gather Supplies (Double check for correct medication and dosage).  2. Wash Hands  3. Choose a site for the injection: Sites include: thigh or buttock  4. Draw up medication into syringe (don't forget to push out excess air with plunger). You will have two needles. Use the larger needle to draw up the medicine. Then, change the needle to the thinner one for the injection. Carefully recap needle and set syringe aside.  5. Wipe injection site with alcohol swab  6. Relax the muscle. Pinch a piece of skin and tissue to inject into  7. Dart the needle through the skin at a 90 degree angle   8. Take hold of syringe with free hand to stabilize   9. Carefully pull back on the plunger slightly (aspirate) and check for blood in the syringe  10. If there is no blood, push plunger to inject medication  11. If there is blood, remove the needle, throw away and start over  12. After injecting the medication, remove the needle and place in sharps container  13. Use a band-aid to cover the site if desired    Intramuscular Injection Safety Tips:    1. Double check for correct medication and dosage  2. Dart the needle in quickly and to the hub  3. Remember to aspirate (remove needle if you see blood, discard and start over)  4. Dispose of  used needle and syringe right away in proper sharps container     You Can Do It!    Once you learn the skills, you will be a pro in no time!  If you are having trouble, consider coming to clinic, or going to the shot clinic at:    [place] [facility],   [address] [facility], [phone]  http://www.[place].org/          Effects and Expected Time Course of Masculinizing  Hormones    Effect Expected Onset Expected Maximum Effect   Skin oiliness/Acne 1-6 months 1-2 years   Facial/body hair growth 3-6 months 3-5 years    Scalp hair loss >12 months+ Variable   Increased muscle mass/strength 6-12 months 2-5  years   Body fat redistribution 3-6 months 2-5 years   Cessation of menses 2-6 months n/a   Clitoral enlargement 3-6 months 1-2 years   Vaginal atrophy 3-6 months 1-2 years   Deepened voice 3-12 months 1-2 years                 Follow-ups after your visit        Who to contact     Please call your clinic at [phone] to:    Ask questions about your health    Make or cancel appointments    Discuss your medicines    Learn about your test results    Speak to your doctor            Additional Information About Your Visit        [employer] Information     "[employer]" gives you secure access to your electronic health record. If you see a primary care provider, you can also send messages to your care team and make appointments. If you have questions, please call your primary care clinic.  If you do not have a primary care provider, please call [phone] and they will assist you.      "[employer]" is an electronic gateway that provides easy, online access to your medical records. With "[employer]", you can request a clinic appointment, read your test results, renew a prescription or communicate with your care team.     To access your existing account, please contact your [facility] or call [phone] for assistance.        Care EveryWhere ID     This is your Care EveryWhere ID. This could be used by other organizations to access your [facility] medical records  [id number]        Your Vitals Were     Pulse Temperature Last Period Pulse Oximetry Breastfeeding?       76 98.1  F (36.7  C) (Oral) 05/04/2018 98% No        Blood Pressure from Last 3 Encounters:   05/14/18 121/75   04/17/18 117/76    Weight from Last 3 Encounters:   05/14/18 133 lb 6.4 oz (60.5 kg)   04/17/18 134 lb 12.8 oz (61.1 kg)              Today, you had the following     No orders found for display         Today's Medication Changes          These changes are accurate as of 5/14/18 10:15 AM.  If you have any questions, ask your nurse  "or doctor.               Start taking these medicines.        Dose/Directions    needle (disp) 18G X 1\" Misc   Used for:  Gender dysphoria in adult   Started by:  Ema Arechiga MD        Use to draw up hormones once weekly   Quantity:  25 each   Refills:  3       Needle (Disp) 25G X 1\" Misc   Used for:  Gender dysphoria in adult   Started by:  Ema Arechiga MD        Use to administer hormone IM once weekly   Quantity:  50 each   Refills:  3       syringe (disposable) 1 ML Misc   Used for:  Gender dysphoria in adult   Started by:  Ema Arechiga MD        Use once weekly to draw up hormones   Quantity:  25 each   Refills:  3       testosterone cypionate 200 MG/ML injection   Commonly known as:  DEPOTESTOTERONE   Used for:  Gender dysphoria in adult   Started by:  Ema Arechiga MD        Dose:  40 mg   Inject 0.2 mLs (40 mg) into the muscle once a week   Quantity:  2 mL   Refills:  2            Where to get your medicines      These medications were sent to [facility] [address] [place] [address] AT [address] [address]    Hours:  24-hours Phone:  [phone]     needle (disp) 18G X 1\" Misc    Needle (Disp) 25G X 1\" Misc    syringe (disposable) 1 ML Misc         Some of these will need a paper prescription and others can be bought over the counter.  Ask your nurse if you have questions.     Bring a paper prescription for each of these medications     testosterone cypionate 200 MG/ML injection                Primary Care Provider Fax #    Physician No Ref-Primary [phone]       No address on file        Equal Access to Services     [facility] OTTO : Hadii maryana huff Sosanket, waaxda luqadaha, qaybta kaalmada sienna richards . So [facility] [phone].    ATENCIÓN: Si habla español, tiene a contreras disposición servicios gratuitos de asistencia lingüística. Llame al [phone].    We comply with " "applicable federal civil rights laws and [place] laws. We do not discriminate on the basis of race, color, national origin, age, disability, sex, sexual orientation, or gender identity.            Thank you!     Thank you for choosing [facility]  for your care. Our goal is always to provide you with excellent care. Hearing back from our patients is one way we can continue to improve our services. Please take a few minutes to complete the written survey that you may receive in the mail after your visit with us. Thank you!             Your Updated Medication List - Protect others around you: Learn how to safely use, store and throw away your medicines at www.disposemymeds.org.          This list is accurate as of 5/14/18 10:15 AM.  Always use your most recent med list.                   Brand Name Dispense Instructions for use Diagnosis    needle (disp) 18G X 1\" Misc     25 each    Use to draw up hormones once weekly    Gender dysphoria in adult       Needle (Disp) 25G X 1\" Misc     50 each    Use to administer hormone IM once weekly    Gender dysphoria in adult       sertraline 50 MG tablet    ZOLOFT    30 tablet    Take 1/2 tablet (25 mg) for 1 week, then increase to 1 tablet orally daily    Moderate episode of recurrent major depressive disorder (H)       syringe (disposable) 1 ML Misc     25 each    Use once weekly to draw up hormones    Gender dysphoria in adult       testosterone cypionate 200 MG/ML injection    DEPOTESTOTERONE    2 mL    Inject 0.2 mLs (40 mg) into the muscle once a week    Gender dysphoria in adult       TYLENOL PO      Take 325 mg by mouth as needed for mild pain or fever          "

## 2018-05-14 NOTE — PROGRESS NOTES
"            ERIC Hassan is a 28 year old individual that uses pronouns He/Him/His/Himself that presents today to start masculinizing hormone therapy. Gender identity: male    Masculinizing Therapy  Wants to start testosterone. Wants to fully masculinize. Previous uncertainty came from not wanting to let go of the lesbian community, which he has now done.   Worried about hair loss. Father has hair, but mom's father does not. Half of cousins have lost   Vaginal driness, atrophy questions  Recent pap at [facility] a few weeks ago - NIL, BV treated with abx.   Never interested in having own biological children, even when a female-identified lesbian, had always imagined that partner would carry any future children.       Depression  Much improved. Attributes improvement to 50mg zoloft, weather improving, gender stuff resolving a little bit,   Continues to see therapist Cici jackson   Low appetite. Low libido at baseline.   Now only a few \"sad\" days, otherwise \"content\". PHQ9=7 today. GAD7=2    PHQ-9 SCORE 4/13/2018 4/17/2018   Total Score 18 18   .  TODD-7 SCORE 4/17/2018   Total Score 14     ---    Past Surgical History:   Procedure Laterality Date     ENT SURGERY         Patient Active Problem List   Diagnosis     TODD (generalized anxiety disorder)     Moderate episode of recurrent major depressive disorder (H)     Gender dysphoria in adult     Healthcare maintenance       Current Outpatient Prescriptions   Medication Sig Dispense Refill     Acetaminophen (TYLENOL PO) Take 325 mg by mouth as needed for mild pain or fever       sertraline (ZOLOFT) 50 MG tablet Take 1/2 tablet (25 mg) for 1 week, then increase to 1 tablet orally daily 30 tablet 3       History   Smoking Status     Never Smoker   Smokeless Tobacco     Never Used          Allergies   Allergen Reactions     Cats      Sneezing and watery eyes     Omnicef [Cefdinir] Hives     SOB. Swelling of face     Penicillins Hives       Problem, Medication and Allergy Lists " were reviewed and updated if needed..         Review of Systems:                Physical Exam:     Vitals:    05/14/18 0944   BP: 121/75   Pulse: 76   Temp: 98.1  F (36.7  C)   TempSrc: Oral   SpO2: 98%   Weight: 133 lb 6.4 oz (60.5 kg)     BMI= There is no height or weight on file to calculate BMI.   Wt Readings from Last 10 Encounters:   05/14/18 133 lb 6.4 oz (60.5 kg)   04/17/18 134 lb 12.8 oz (61.1 kg)     Appearance: Male appearance and dress    GENERAL:: healthy, alert and no distress  Affect: Appropriate/mood-congruent           Labs:   Results from last visit:  Office Visit on 04/13/2018   Component Date Value Ref Range Status     Testosterone Total 04/13/2018 42  8 - 60 ng/dL Final    Comment: This test was developed and its performance characteristics determined by the   [facility],  Special Chemistry Laboratory. It has   not been cleared or approved by the FDA. The laboratory is regulated under   CLIA as qualified to perform high-complexity testing. This test is used for   clinical purposes. It should not be regarded as investigational or for   research.       WBC 04/13/2018 6.3  4.0 - 11.0 K/uL Final     Lymphocytes # 04/13/2018 1.5  0.8 - 5.3 K/uL Final     % Lymphocytes 04/13/2018 23.4  20.0 - 48.0 %L Final     Mid # 04/13/2018 0.4  0.0 - 2.2 K/uL Final     Mid % 04/13/2018 6.1  0.0 - 20.0 %M Final     GRANULOCYTES # 04/13/2018 4.4  1.6 - 8.3 K/uL Final     % Granulocytes 04/13/2018 70.5  40.0 - 75.0 %G Final     RBC 04/13/2018 4.28  3.80 - 5.20 M/uL Final     Hemoglobin 04/13/2018 13.3  11.7 - 15.7 g/dL Final     Hematocrit 04/13/2018 42.0  35.0 - 47.0 % Final     MCV 04/13/2018 98.1  78.0 - 100.0 fL Final     MCH 04/13/2018 31.1  26.5 - 35.0 pg Final     MCHC 04/13/2018 31.7* 32.0 - 36.0 g/dL Final     Platelets 04/13/2018 337.0  150.0 - 450.0 K/uL Final     Albumin 04/13/2018 4.8  3.8 - 5.0 mg/dL Final     Alkaline Phosphatase 04/13/2018 65.2  31.7 - 110.5 U/L Final      "ALT 04/13/2018 16.8  0.0 - 45.0 U/L Final     AST 04/13/2018 12.6  0.0 - 45.0 U/L Final     Bilirubin Direct 04/13/2018 0.1  0.1 - 0.3 mg/dL Final     Bilirubin Total 04/13/2018 <0.4  0.2 - 1.3 mg/dL Final     Protein Total 04/13/2018 7.2  6.8 - 8.8 g/dL Final     Cholesterol 04/13/2018 169.0  0.0 - 200.0 mg/dL Final     Cholesterol/HDL Ratio 04/13/2018 2.5  0.0 - 5.0 Final     HDL Cholesterol 04/13/2018 68.8  >40.0 mg/dL Final     LDL Cholesterol Calculated 04/13/2018 93  0 - 129 mg/dL Final     Triglycerides 04/13/2018 36.0  0.0 - 150.0 mg/dL Final     VLDL Cholesterol 04/13/2018 7.2  7.0 - 32.0 mg/dL Final     Glucose 04/13/2018 103.0* 70.0 - 99.0 mg'dL Final       Assessment and Plan       Patient Instructions     Here is the plan from today's visit    Depression  Continue zoloft 50mg, room to go up to 75 or 100 if needed in the future    1. Gender dysphoria in adult  Well consolidated in male gender identity. Has done lots of research on his own and is making a very informed decision. Read consent form prior to appointment and brought appropriate questions. We reviewed the form together today line by line and he understands risks/benefits of testosterone. All baseline labs normal. Will plan to start low and go slow given concurrent new depression treatment. Plan to increase to 50 or 60mg in 1 month if mood is ok.   SHOT TEACHING - RN at [facility], or shot clinic  - Needle, Disp, 25G X 1\" MISC; Use to administer hormone IM once weekly  Dispense: 50 each; Refill: 3  - testosterone cypionate (DEPOTESTOTERONE) 200 MG/ML injection; Inject 0.2 mLs (40 mg) into the muscle once a week  Dispense: 2 mL; Refill: 2  - needle, disp, 18G X 1\" MISC; Use to draw up hormones once weekly  Dispense: 25 each; Refill: 3  - syringe, disposable, 1 ML MISC; Use once weekly to draw up hormones  Dispense: 25 each; Refill: 3      Follow up plan  Please make a clinic appointment for follow up with me (LACEY MEDEIROS) in 1  month for " hormone follow up.      Contraception:   No contact with sperm havers.   .   Counselled patient about controlled substances: Yes. Details: script = cash, can't replace, no sharing, can't transfer [id number]    Results by joce  Questions were elicited and answered.     I spent 25 min face to face with the patient and >50% was spent counselling the patient about the above medical conditions, educating patient and discussing the recommendations and followup .    Ema Arechiga MD   [facility]

## 2018-05-14 NOTE — MR AVS SNAPSHOT
After Visit Summary   5/14/2018    Denise Lopez    MRN: [id number]           Patient Information     Date Of Birth          1990        Visit Information        Provider Department      5/14/2018 11:00 AM Nurse, [facility]        Today's Diagnoses     Gender dysphoria in adult    -  1       Follow-ups after your visit        Who to contact     Please call your clinic at [phone] to:    Ask questions about your health    Make or cancel appointments    Discuss your medicines    Learn about your test results    Speak to your doctor            Additional Information About Your Visit        MyChart Information     [employer] gives you secure access to your electronic health record. If you see a primary care provider, you can also send messages to your care team and make appointments. If you have questions, please call your primary care clinic.  If you do not have a primary care provider, please call [phone] and they will assist you.      [employer] is an electronic gateway that provides easy, online access to your medical records. With [employer], you can request a clinic appointment, read your test results, renew a prescription or communicate with your care team.     To access your existing account, please contact your [facility] or call [phone] for assistance.        Care EveryWhere ID     This is your Care EveryWhere ID. This could be used by other organizations to access your [facility] records  [id number]        Your Vitals Were     Last Period                   05/04/2018            Blood Pressure from Last 3 Encounters:   05/14/18 121/75   04/17/18 117/76    Weight from Last 3 Encounters:   05/14/18 133 lb 6.4 oz (60.5 kg)   04/17/18 134 lb 12.8 oz (61.1 kg)              Today, you had the following     No orders found for display         Today's Medication Changes          These changes are accurate as of 5/14/18 11:41  "AM.  If you have any questions, ask your nurse or doctor.               Start taking these medicines.        Dose/Directions    needle (disp) 18G X 1\" Misc   Used for:  Gender dysphoria in adult   Started by:  Ema Arechiga MD        Use to draw up hormones once weekly   Quantity:  25 each   Refills:  3       Needle (Disp) 25G X 1\" Misc   Used for:  Gender dysphoria in adult   Started by:  Ema Arechiga MD        Use to administer hormone IM once weekly   Quantity:  50 each   Refills:  3       syringe (disposable) 1 ML Misc   Used for:  Gender dysphoria in adult   Started by:  Ema Arechiga MD        Use once weekly to draw up hormones   Quantity:  25 each   Refills:  3       testosterone cypionate 200 MG/ML injection   Commonly known as:  DEPOTESTOTERONE   Used for:  Gender dysphoria in adult   Started by:  Ema Arechiga MD        Dose:  40 mg   Inject 0.2 mLs (40 mg) into the muscle once a week   Quantity:  2 mL   Refills:  2            Where to get your medicines      These medications were sent to [facility] [place], [place] - [address] [facility]  [address] [facility] [address]     Phone:  [phone]     needle (disp) 18G X 1\" Misc    Needle (Disp) 25G X 1\" Misc    syringe (disposable) 1 ML Misc         Some of these will need a paper prescription and others can be bought over the counter.  Ask your nurse if you have questions.     Bring a paper prescription for each of these medications     testosterone cypionate 200 MG/ML injection                Primary Care Provider Fax #    Physician No Ref-Primary [phone]       No address on file        Equal Access to Services     FLORENTIN MENJIVAR : Hadii maryana huff Sosanket, waaxda luqadaha, qaybta kaalmada adeegangelique, sienna brito . So [facility] [phone].    ATENCIÓN: Si habla español, tiene a contreras disposición servicios gratuitos de asistencia lingüística. Llame al [phone].    We comply " "with applicable federal civil rights laws and [place] laws. We do not discriminate on the basis of race, color, national origin, age, disability, sex, sexual orientation, or gender identity.            Thank you!     Thank you for choosing [facility]  for your care. Our goal is always to provide you with excellent care. Hearing back from our patients is one way we can continue to improve our services. Please take a few minutes to complete the written survey that you may receive in the mail after your visit with us. Thank you!             Your Updated Medication List - Protect others around you: Learn how to safely use, store and throw away your medicines at www.disposemymeds.org.          This list is accurate as of 5/14/18 11:41 AM.  Always use your most recent med list.                   Brand Name Dispense Instructions for use Diagnosis    needle (disp) 18G X 1\" Misc     25 each    Use to draw up hormones once weekly    Gender dysphoria in adult       Needle (Disp) 25G X 1\" Misc     50 each    Use to administer hormone IM once weekly    Gender dysphoria in adult       sertraline 50 MG tablet    ZOLOFT    30 tablet    Take 1/2 tablet (25 mg) for 1 week, then increase to 1 tablet orally daily    Moderate episode of recurrent major depressive disorder (H)       syringe (disposable) 1 ML Misc     25 each    Use once weekly to draw up hormones    Gender dysphoria in adult       testosterone cypionate 200 MG/ML injection    DEPOTESTOTERONE    2 mL    Inject 0.2 mLs (40 mg) into the muscle once a week    Gender dysphoria in adult       TYLENOL PO      Take 325 mg by mouth as needed for mild pain or fever          "

## 2018-05-14 NOTE — LETTER
To Whom it May Concern:       May 14, 2018  Regarding:  Denise Lopez  : 1990  Pt goes by: Mo  MRN: [id number]    Dear Evaluator,    I am writing on behalf of my patient, Denise Lopez, who uses Mo.  I understand Mo is requesting a variance so that his documents may reflect Mo's actual gender. I am writing to provide a summary of Mo s medical care to provide further support for his gender being listed as Male.  Mo was female [id number]-[id number]-birth and has transitioned gender to Male     I am a [place]-licensed family physician practicing at [facility], [facility] in [place]. I am experienced in evaluation and treatment of patients with gender dysphoria and/or nonbinary gender identities.    Mo and I began a doctor-patient relationship in 2018. I have reviewed and evaluated Mo's medical history.    Denise Lopez has had  the appropriate clinical treatment for transition to the gender male    Documents, like a  s license, passport or birth certificate that accurately identify Mo, are essential.  I recommend these reflect Mo's accurate and true gender identity.      I declare under penalty of perjury under the laws of the [place] that the forgoing is true and correct.      Sincerely,    Ema Arechiga    [facility]

## 2018-05-14 NOTE — TELEPHONE ENCOUNTER
Add letter when received    Prior Authorization Approval    Authorization Effective Date: 5/14/2018  Authorization Expiration Date: 5/14/2021  Medication: testosterone cypionate (DEPOTESTOTERONE) 200 MG/ML injection-APPROVED  Approved Dose/Quantity:  Reference #: [id number]   Insurance Company: [employer] - Phone [phone] Fax [phone]  Expected CoPay:       CoPay Card Available:      Foundation Assistance Needed:    Which Pharmacy is filling the prescription (Not needed for infusion/clinic administered): [facility] [place], [place] - [address]   Pharmacy Notified: Yes  Patient Notified: No

## 2018-05-14 NOTE — NURSING NOTE
"Patient presented to clinic with all supplies for IM injection teaching. Patient's partner and friend was present at visit.     RN reviewed necessary supplies: difference in needles (drawing up vs injecting), how to read a syringe, how to read medication label, disposal of sharps, and proper hand hygiene.     Discussed how to switch out needles and patient demonstrated understanding of reading syringe. RN reviewed safe needle handling (using \"scoop\" method). Patient independently jermain up proper amount of Testosterone.     RN discussed site for IM injection and reviewed proper IM injection technique. Patient practiced using injecting pad.    At end of visit, patient independently completed self-injection of 0.2 mL (40 mg) Testosterone into right thigh under supervision of RN.    Completed visit with discussion of refill policy.     Patient verbalized understanding and was engaged during appointment.    Dr. Arechiga was the provider on site for this visit and available for questions.    Pamela Alatorre RN      "

## 2018-05-14 NOTE — PATIENT INSTRUCTIONS
"Here is the plan from today's visit    Depression  Continue zoloft 50mg, room to go up to 75 or 100 if needed in the future    1. Gender dysphoria in adult  SHOT TEACHING - RN at [facility], or shot clinic  - Needle, Disp, 25G X 1\" MISC; Use to administer hormone IM once weekly  Dispense: 50 each; Refill: 3  - testosterone cypionate (DEPOTESTOTERONE) 200 MG/ML injection; Inject 0.2 mLs (40 mg) into the muscle once a week  Dispense: 2 mL; Refill: 2  - needle, disp, 18G X 1\" MISC; Use to draw up hormones once weekly  Dispense: 25 each; Refill: 3  - syringe, disposable, 1 ML MISC; Use once weekly to draw up hormones  Dispense: 25 each; Refill: 3      Follow up plan  Please make a clinic appointment for follow up with me (LACEY MEDEIROS) in 1  month for hormone follow up.      Patient Education Information Sheet   [facility]      You Will Be Able To:  1. Self inject your medication accurately  2. Receive the injection in the comfort of your own home    How Do I Get My Medication?      We will enter a prescription for your medication and supplies. They can be picked up at the pharmacy or mailed to your home by the pharmacy. It may come in a 1 ml or 10ml size.     1ml=1cc           10ml is 2 teaspoons    How Do I Store My Medication?  Testosterone and estrogen can be stored at room temperature.   USP ([place] Pharmacopeia) recommends you dispose of the vial after 28 days. Many patients keep it for longer, but how long the medicine stays effective after exposure to air and light is uncertain. Definitely if its many months old, consider discarding it and getting a new vial.    Special Note for Testosterone and Estrogen Injections:    Hormones are in an oil which is thick & can be hard to draw into the syringe.    You will receive two needles for each hormone injection.     One needle is larger and used to draw the medication into the syringe (18g)     The other is thinner and used for the " injection (22 or 23g)    Equipment Needed  1. Medication Vial   2. Syringe   3. 2 Needles  4. Alcohol swab or Alcohol and cotton balls  5. Band-aid if needed  6. Sharps Disposal   can use laundry detergent tub             Parts of a Syringe      Intramuscular Injection (IM)  The needle passes through the skin and subcutaneous fatty tissue then medication is injected directly into the muscle. Push the syringe straight in, all the way to the hub. Then depress the plunger, waiting a few seconds for the medication to enter the muscle. Then remove the syringe and dispose of it.        Sites for IM Injection (thigh)  Sites for IM Injection (Hip)               Steps for Intramuscular Injection   ? Gather Supplies (Double check for correct medication and dosage).  ? Wash Hands  ? Choose a site for the injection: Sites include: thigh or buttock  ? Draw up medication into syringe (don t forget to push out excess air with plunger). You will have two needles. Use the larger needle to draw up the medicine. Then, change the needle to the thinner one for the injection. Carefully recap needle and set syringe aside.  ? Wipe injection site with alcohol swab  ? Relax the muscle. Pinch a piece of skin and tissue to inject into  ? Dart the needle through the skin at a 90 degree angle   ? Take hold of syringe with free hand to stabilize   ? Carefully pull back on the plunger slightly (aspirate) and check for blood in the syringe  ? If there is no blood, push plunger to inject medication  ? If there is blood, remove the needle, throw away and start over  ? After injecting the medication, remove the needle and place in sharps container  ? Use a band-aid to cover the site if desired      Intramuscular Injection Safety Tips  1. Double check for correct medication and dosage  2. Dart the needle in quickly and to the hub  3. Remember to aspirate (remove needle if you see blood, discard and start over)  4. Dispose of  used needle and syringe  right away in proper sharps container     You Can Do It!  Once you learn the skills, you will be a pro in no time!  If you are having trouble, consider coming to clinic, or going to the shot clinic at [facility], [address] [place] [address], [phone]  [email or web address] [place] [email or web address]        Thank you for coming to [facility] today.  Lab Testing:  **If you had lab testing today and your results are reassuring or normal they will be mailed to you or sent through [employer] within 7 days.   **If the lab tests need quick action we will call you with the results.  The phone number we will call with results is # [phone] (home) . If this is not the best number please call our clinic and change the number.  Medication Refills:  If you need any refills please call your pharmacy and they will contact us.   If you need to  your refill at a new pharmacy, please contact the new pharmacy directly. The new pharmacy will help you get your medications transferred faster.   Scheduling:  If you have any concerns about today's visit or wish to schedule another appointment please call our office during normal business hours [phone] (8-5:00 M-F)  If a referral was made to a [facility] and you don't get a call from central scheduling please call [phone].  If a Mammogram was ordered for you at [facility] call [phone] to schedule or change your appointment.  If you had an XRay/CT/Ultrasound/MRI ordered the number is [phone] to schedule or change your radiology appointment.   Medical Concerns:  If you have urgent medical concerns please call [phone] at any time of the day.    Ema Arechiga MD      Some online resources for transgender health    [facility]   Home to the Shot Clinic at [address] [place] [address], [phone]. Also has support groups.  http://www.[facility] [place] [facility].[email or web address]/    Center of Excellence  for Transgender Health  Increasing access to comprehensive, effective, and affirming healthcare services for trans and gender-variant communities.  http://www.transhealth.[facility].edu/trans?page=dakota-00-05    [facility]   Community-based non-profit committed to advancing the health & wellness of LGBTQ communities through research, education and advocacy. http://www.[facility].org    [facility] Glossary of Transgender Terms  http://www.[facility].org/site/DocServer/Handout_7-C_Glossary_of_Gender_and_Transgender_Terms__fi.pdf?plnOJ=5372    Safe, gender-neutral public restrooms in [facility]   http://www.[facility].org//index.php?option=com_content&task=view&id=12&Itemid    Trans Youth Support Network  For people 26 and under who identify as a trans or gender non-conforming person and want to be a part of an activist organization. Offers peer support, education and community building opportunities.   http://www.transyouthsupportnetwork.org/    Reclaim:  RECLAIM offers mental and integrative health services for LGBTQ youth and their families.  http://www.reclaim-lgbtyouth.org/    Gender Spectrum, for Trans Youth  Gender Spectrum provides education, training and support to help create a gender sensitive and inclusive environment for all children and teens. http://www.genderspectrum.org/    [employer] FTM Resource Guide  Information on topics of interest to female-to-male (FTM, F2M) trans men, and their friends and loved ones.  http://ftmguide.org/    Also check out your local [employer] resource center!  LGBTQIA Services at [facility]: http://www.[facility]/lgbtqia/  LGBTQ@Mac at [facility]: http://www.[facility].edu/multiculturallife/lgbtq/  GLBTA Programs office at  of : https://diversity.[facility].edu/glbta/          Patient Education Information Sheet   [facility]      You Will Be Able To:    1.  Self inject your medication accurately  2.  Receive the injection in the comfort of  your own home    How Do I Get My Medication?    We will enter a prescription for your medication and supplies. They can be picked up at the pharmacy or mailed to your home by the pharmacy. It may come in a 1 ml or 10ml size.        1ml=1cc             10ml is 2 teaspoons    How Do I Store My Medication?    Testosterone and estrogen can be stored at room temperature.   USP ([place] Pharmacopeia) recommends you dispose of the vial after 28 days. Many patients keep it for longer, but how long the medicine stays effective after exposure to air and light is uncertain. Definitely if its many months old, consider discarding it and getting a new vial.    Special Note for Testosterone and Estrogen Injections:      Hormones are in an oil which is thick & can be hard to draw into the syringe.    You will receive two needles for each hormone injection.     One needle is larger and used to draw the medication into the syringe (18g)     The other is thinner and used for the injection (22 or 23g)    Equipment Needed:    1. Medication Vial   2. Syringe   3. 2 Needles  4. Alcohol swab or Alcohol and cotton balls  5. Band-aid if needed  6. Sharps Disposal - can use laundry detergent tub       Parts of a Syringe    Intramuscular Injection (IM)  The needle passes through the skin and subcutaneous fatty tissue then medication is injected directly into the muscle. Push the syringe straight in, all the way to the hub. Then depress the plunger, waiting a few seconds for the medication to enter the muscle. Then remove the syringe and dispose of it.    Sites for IM Injection (thigh)      Steps for Intramuscular Injection:    1. Gather Supplies (Double check for correct medication and dosage).  2. Wash Hands  3. Choose a site for the injection: Sites include: thigh or buttock  4. Draw up medication into syringe (don't forget to push out excess air with plunger). You will have two needles. Use the larger needle to draw up the medicine.  Then, change the needle to the thinner one for the injection. Carefully recap needle and set syringe aside.  5. Wipe injection site with alcohol swab  6. Relax the muscle. Pinch a piece of skin and tissue to inject into  7. Dart the needle through the skin at a 90 degree angle   8. Take hold of syringe with free hand to stabilize   9. Carefully pull back on the plunger slightly (aspirate) and check for blood in the syringe  10. If there is no blood, push plunger to inject medication  11. If there is blood, remove the needle, throw away and start over  12. After injecting the medication, remove the needle and place in sharps container  13. Use a band-aid to cover the site if desired    Intramuscular Injection Safety Tips:    1. Double check for correct medication and dosage  2. Dart the needle in quickly and to the hub  3. Remember to aspirate (remove needle if you see blood, discard and start over)  4. Dispose of  used needle and syringe right away in proper sharps container     You Can Do It!    Once you learn the skills, you will be a pro in no time!  If you are having trouble, consider coming to clinic, or going to the shot clinic at:    [facility],   [address] [facility] [place], [phone]  http://www.[facility].org/          Effects and Expected Time Course of Masculinizing  Hormones    Effect Expected Onset Expected Maximum Effect   Skin oiliness/Acne 1-6 months 1-2 years   Facial/body hair growth 3-6 months 3-5 years    Scalp hair loss >12 months+ Variable   Increased muscle mass/strength 6-12 months 2-5 years   Body fat redistribution 3-6 months 2-5 years   Cessation of menses 2-6 months n/a   Clitoral enlargement 3-6 months 1-2 years   Vaginal atrophy 3-6 months 1-2 years   Deepened voice 3-12 months 1-2 years

## 2018-05-14 NOTE — TELEPHONE ENCOUNTER
PA NEEDED ON: Testosterone 200  INS IS: [facility]   PHONE # IS: [phone]  ID # IS: [id number]    PLEASE LET US KNOW WHEN PA IS GRANTED/DENIED.  THANK YOU!    [facility]  [address] [place] [address]  Phone: [phone]  Fax: [phone]

## 2018-06-19 ASSESSMENT — PATIENT HEALTH QUESTIONNAIRE - PHQ9: SUM OF ALL RESPONSES TO PHQ QUESTIONS 1-9: 7

## 2018-06-19 ASSESSMENT — ANXIETY QUESTIONNAIRES: GAD7 TOTAL SCORE: 2

## 2018-06-26 ENCOUNTER — OFFICE VISIT (OUTPATIENT)
Dept: FAMILY MEDICINE | Facility: CLINIC | Age: 28
End: 2018-06-26
Payer: COMMERCIAL

## 2018-06-26 VITALS
SYSTOLIC BLOOD PRESSURE: 114 MMHG | OXYGEN SATURATION: 97 % | HEART RATE: 68 BPM | RESPIRATION RATE: 18 BRPM | DIASTOLIC BLOOD PRESSURE: 72 MMHG | WEIGHT: 140 LBS | TEMPERATURE: 99 F

## 2018-06-26 DIAGNOSIS — F33.1 MODERATE EPISODE OF RECURRENT MAJOR DEPRESSIVE DISORDER (H): ICD-10-CM

## 2018-06-26 DIAGNOSIS — F64.0 GENDER DYSPHORIA IN ADULT: Primary | ICD-10-CM

## 2018-06-26 ASSESSMENT — ANXIETY QUESTIONNAIRES
7. FEELING AFRAID AS IF SOMETHING AWFUL MIGHT HAPPEN: NOT AT ALL
GAD7 TOTAL SCORE: 8
6. BECOMING EASILY ANNOYED OR IRRITABLE: MORE THAN HALF THE DAYS
3. WORRYING TOO MUCH ABOUT DIFFERENT THINGS: SEVERAL DAYS
5. BEING SO RESTLESS THAT IT IS HARD TO SIT STILL: SEVERAL DAYS
2. NOT BEING ABLE TO STOP OR CONTROL WORRYING: SEVERAL DAYS
IF YOU CHECKED OFF ANY PROBLEMS ON THIS QUESTIONNAIRE, HOW DIFFICULT HAVE THESE PROBLEMS MADE IT FOR YOU TO DO YOUR WORK, TAKE CARE OF THINGS AT HOME, OR GET ALONG WITH OTHER PEOPLE: SOMEWHAT DIFFICULT
1. FEELING NERVOUS, ANXIOUS, OR ON EDGE: SEVERAL DAYS

## 2018-06-26 ASSESSMENT — PATIENT HEALTH QUESTIONNAIRE - PHQ9: 5. POOR APPETITE OR OVEREATING: MORE THAN HALF THE DAYS

## 2018-06-26 NOTE — PROGRESS NOTES
"            ERIC Hassan is a 28 year old individual that uses pronouns He/Him/His/Himself that presents today for follow up of:  masculinizing hormone therapy. Gender identity: male    Any special concerns today?  no current concerns    On hormones?  YES +++ Shot day of the week? Sunday      Due for labs?  No      +++ Refills of meds needed?  ?    Masculinizing therapy  5/14/18 Testosterone cypionate 40mg IM weekly started   Going well, no physical changes noted.   Tired a lot, thought would have more energy  Has been taking antidepressants at night, getting weird bursts of energy at night  For several weeks thought depression was worse, but now sleep schedule is more normal and going to gym, depression not as bad. Intrusive thoughts over a week ago. No suicidal thoughts or plan.     ---    Past Surgical History:   Procedure Laterality Date     ENT SURGERY         Patient Active Problem List   Diagnosis     TODD (generalized anxiety disorder)     Moderate episode of recurrent major depressive disorder (H)     Gender dysphoria in adult     Healthcare maintenance       Current Outpatient Prescriptions   Medication Sig Dispense Refill     Acetaminophen (TYLENOL PO) Take 325 mg by mouth as needed for mild pain or fever       needle, disp, 18G X 1\" MISC Use to draw up hormones once weekly 25 each 3     Needle, Disp, 25G X 1\" MISC Use to administer hormone IM once weekly 50 each 3     sertraline (ZOLOFT) 50 MG tablet Take 1/2 tablet (25 mg) for 1 week, then increase to 1 tablet orally daily 30 tablet 3     syringe, disposable, 1 ML MISC Use once weekly to draw up hormones 25 each 3     testosterone cypionate (DEPOTESTOTERONE) 200 MG/ML injection Inject 0.2 mLs (40 mg) into the muscle once a week 2 mL 2       History   Smoking Status     Never Smoker   Smokeless Tobacco     Never Used          Allergies   Allergen Reactions     Cats      Sneezing and watery eyes     Omnicef [Cefdinir] Hives     SOB. Swelling of face     " "Penicillins Hives       Problem, Medication and Allergy Lists were reviewed and updated if needed..         Review of Systems:      General    Fat redistribution: no    Weight change: no HEENT    Voice change: no     Cardiovascular (CV)    Chest Pains: no    Shortness of breath: no Chest    Decreased exercise tolerance:  no    Breast changes/development: no     Gastrointestinal (GI)    Abdominal pain: no    Change in appetite: overall increase but still more susceptible to moood Skin    Acne or oily skin: no, face skin somewhat rougher    Change in hair: growing more hair on legs, several moustache hairs     Genitourinary ()    Abnormal vaginal bleeding:     Period 3 weeks ago \"terrible\", super bad cramps and heavy bleeding, didn't last as long, only 6 days.     Change in libido: increased    New sexual partners: no Musculoskeletal    Leg pain or swelling: no     Psychiatric (Psych)    Depression: YES    Anxiety/Panic: no change, goes down when depression increases.     Mood:  depressed                    Physical Exam:     Vitals:    06/26/18 0753   BP: 114/72   BP Location: Left arm   Patient Position: Chair   Cuff Size: Adult Regular   Pulse: 68   Resp: 18   Temp: 99  F (37.2  C)   TempSrc: Oral   SpO2: 97%   Weight: 140 lb (63.5 kg)     BMI= There is no height or weight on file to calculate BMI.   Wt Readings from Last 10 Encounters:   06/26/18 140 lb (63.5 kg)   05/14/18 133 lb 6.4 oz (60.5 kg)   04/17/18 134 lb 12.8 oz (61.1 kg)     Appearance: Male appearance and dress    GENERAL:: healthy, alert and no distress  RESP: lungs clear to auscultation - no rales, no rhonchi, + mild expiratory wheezes  CV: regular rates and rhythm, normal S1 S2, no S3 or S4 and no murmur, no click or rub -  Affect: Appropriate/mood-congruent    PHQ-9 SCORE 4/17/2018 5/14/2018 6/26/2018   Total Score 18 7 12              Labs:   Results from last visit:  Office Visit on 04/13/2018   Component Date Value Ref Range Status     " Testosterone Total 04/13/2018 42  8 - 60 ng/dL Final    Comment: This test was developed and its performance characteristics determined by the   [facility],  Special Chemistry Laboratory. It has   not been cleared or approved by the FDA. The laboratory is regulated under   CLIA as qualified to perform high-complexity testing. This test is used for   clinical purposes. It should not be regarded as investigational or for   research.       WBC 04/13/2018 6.3  4.0 - 11.0 K/uL Final     Lymphocytes # 04/13/2018 1.5  0.8 - 5.3 K/uL Final     % Lymphocytes 04/13/2018 23.4  20.0 - 48.0 %L Final     Mid # 04/13/2018 0.4  0.0 - 2.2 K/uL Final     Mid % 04/13/2018 6.1  0.0 - 20.0 %M Final     GRANULOCYTES # 04/13/2018 4.4  1.6 - 8.3 K/uL Final     % Granulocytes 04/13/2018 70.5  40.0 - 75.0 %G Final     RBC 04/13/2018 4.28  3.80 - 5.20 M/uL Final     Hemoglobin 04/13/2018 13.3  11.7 - 15.7 g/dL Final     Hematocrit 04/13/2018 42.0  35.0 - 47.0 % Final     MCV 04/13/2018 98.1  78.0 - 100.0 fL Final     MCH 04/13/2018 31.1  26.5 - 35.0 pg Final     MCHC 04/13/2018 31.7* 32.0 - 36.0 g/dL Final     Platelets 04/13/2018 337.0  150.0 - 450.0 K/uL Final     Albumin 04/13/2018 4.8  3.8 - 5.0 mg/dL Final     Alkaline Phosphatase 04/13/2018 65.2  31.7 - 110.5 U/L Final     ALT 04/13/2018 16.8  0.0 - 45.0 U/L Final     AST 04/13/2018 12.6  0.0 - 45.0 U/L Final     Bilirubin Direct 04/13/2018 0.1  0.1 - 0.3 mg/dL Final     Bilirubin Total 04/13/2018 <0.4  0.2 - 1.3 mg/dL Final     Protein Total 04/13/2018 7.2  6.8 - 8.8 g/dL Final     Cholesterol 04/13/2018 169.0  0.0 - 200.0 mg/dL Final     Cholesterol/HDL Ratio 04/13/2018 2.5  0.0 - 5.0 Final     HDL Cholesterol 04/13/2018 68.8  >40.0 mg/dL Final     LDL Cholesterol Calculated 04/13/2018 93  0 - 129 mg/dL Final     Triglycerides 04/13/2018 36.0  0.0 - 150.0 mg/dL Final     VLDL Cholesterol 04/13/2018 7.2  7.0 - 32.0 mg/dL Final     Glucose 04/13/2018 103.0*  70.0 - 99.0 mg'dL Final       Assessment and Plan     Patient Instructions   Here is the plan from today's visit    1. Gender dysphoria in adult  No dose change, stay at 40mg (0.2ml), no dose increase due to mood periodic worsening of depression.   - Testosterone Total; Future  - Hepatic Panel; Future  - CBC with Diff Plt; Future  - Lipid Cascade; Future    Moderate episode of recurrent major depressive disorder (H)  Increase dose from 50 to 75mg  -     sertraline (ZOLOFT) 50 MG tablet; Take 1.5 tablets (75 mg) by mouth daily      Follow up plan  Please make a clinic appointment for follow up with me (EMA MEDEIROS) in 2 months for clinic visit for hormone and depression follow up. Come to clinic at least 3 days before for lab-only visit.      Counselled patient about controlled substances: Yes. Details: hard script = like cash.     Results by mychart  Questions were elicited and answered.     I spent 25 min face to face with the patient and >50% was spent counselling the patient about the above medical conditions, educating patient and discussing the recommendations and followup .    Ema Medeiros MD  [facility]

## 2018-06-26 NOTE — PATIENT INSTRUCTIONS
Here is the plan from today's visit    1. Gender dysphoria in adult  No dose change, stay at 40mg (0.2ml)  - Testosterone Total; Future  - Hepatic Panel; Future  - CBC with Diff Plt; Future  - Lipid Cascade; Future    Moderate episode of recurrent major depressive disorder (H)  Increase dose from 50 to 75mg  -     sertraline (ZOLOFT) 50 MG tablet; Take 1.5 tablets (75 mg) by mouth daily     Please call or return to clinic if your symptoms don't go away.    Follow up plan  Please make a clinic appointment for follow up with me (LACEY MEDEIROS) in 2 months for clinic visit for hormone and depression follow up. Come to clinic at least 3 days before for lab-only visit.    Thank you for coming to [facility] today.  Lab Testing:  **If you had lab testing today and your results are reassuring or normal they will be mailed to you or sent through [employer] within 7 days.   **If the lab tests need quick action we will call you with the results.  The phone number we will call with results is # [phone] (home) . If this is not the best number please call our clinic and change the number.  Medication Refills:  If you need any refills please call your pharmacy and they will contact us.   If you need to  your refill at a new pharmacy, please contact the new pharmacy directly. The new pharmacy will help you get your medications transferred faster.   Scheduling:  If you have any concerns about today's visit or wish to schedule another appointment please call our office during normal business hours [phone] (8-5:00 M-F)  If a referral was made to a [facility] and you don't get a call from central scheduling please call [phone].  If a Mammogram was ordered for you at [facility] call [phone] to schedule or change your appointment.  If you had an XRay/CT/Ultrasound/MRI ordered the number is [phone] to schedule or change your radiology appointment.   Medical  Concerns:  If you have urgent medical concerns please call [phone] at any time of the day.    Ema Arechiga MD

## 2018-06-26 NOTE — MR AVS SNAPSHOT
After Visit Summary   6/26/2018    Denise Lopez    MRN: [id number]           Patient Information     Date Of Birth          1990        Visit Information        Provider Department      6/26/2018 7:50 AM Ema Medeiros MD [facility]        Today's Diagnoses     Gender dysphoria in adult    -  1    Moderate episode of recurrent major depressive disorder (H)          Care Instructions    Here is the plan from today's visit    1. Gender dysphoria in adult  No dose change, stay at 40mg (0.2ml)  - Testosterone Total; Future  - Hepatic Panel; Future  - CBC with Diff Plt; Future  - Lipid [place]; Future    Moderate episode of recurrent major depressive disorder (H)  Increase dose from 50 to 75mg  -     sertraline (ZOLOFT) 50 MG tablet; Take 1.5 tablets (75 mg) by mouth daily     Please call or return to clinic if your symptoms don't go away.    Follow up plan  Please make a clinic appointment for follow up with me (EMA MEDEIROS) in 2 months for clinic visit for hormone and depression follow up. Come to clinic at least 3 days before for lab-only visit.    Thank you for coming to [facility] today.  Lab Testing:  **If you had lab testing today and your results are reassuring or normal they will be mailed to you or sent through [employer] within 7 days.   **If the lab tests need quick action we will call you with the results.  The phone number we will call with results is # [phone] (home) . If this is not the best number please call our clinic and change the number.  Medication Refills:  If you need any refills please call your pharmacy and they will contact us.   If you need to  your refill at a new pharmacy, please contact the new pharmacy directly. The new pharmacy will help you get your medications transferred faster.   Scheduling:  If you have any concerns about today's visit or wish to schedule another appointment please call our office during normal  business hours [phone] (8-5:00 M-F)  If a referral was made to a [facility] and you don't get a call from central scheduling please call [phone].  If a Mammogram was ordered for you at [facility] call [phone] to schedule or change your appointment.  If you had an XRay/CT/Ultrasound/MRI ordered the number is [phone] to schedule or change your radiology appointment.   Medical Concerns:  If you have urgent medical concerns please call [phone] at any time of the day.    Ema Arechiga MD            Follow-ups after your visit        Future tests that were ordered for you today     Open Future Orders        Priority Expected Expires Ordered    Testosterone Total Routine  6/26/2019 6/26/2018    Hepatic Panel Routine  6/26/2019 6/26/2018    CBC with Diff Plt Routine  6/26/2019 6/26/2018    Lipid [place] Routine  6/26/2019 6/26/2018            Who to contact     Please call your clinic at [phone] to:    Ask questions about your health    Make or cancel appointments    Discuss your medicines    Learn about your test results    Speak to your doctor            Additional Information About Your Visit        [employer] Information     [employer] gives you secure access to your electronic health record. If you see a primary care provider, you can also send messages to your care team and make appointments. If you have questions, please call your primary care clinic.  If you do not have a primary care provider, please call [phone] and they will assist you.      [employer] is an electronic gateway that provides easy, online access to your medical records. With [employer], you can request a clinic appointment, read your test results, renew a prescription or communicate with your care team.     To access your existing account, please contact your [facility] or call [phone] for assistance.        Care EveryWhere ID     This is your Care  EveryWhere ID. This could be used by other organizations to access your [facility] medical records  [id number]        Your Vitals Were     Pulse Temperature Respirations Last Period Pulse Oximetry Breastfeeding?    68 99  F (37.2  C) (Oral) 18 06/03/2018 97% No       Blood Pressure from Last 3 Encounters:   06/26/18 114/72   05/14/18 121/75   04/17/18 117/76    Weight from Last 3 Encounters:   06/26/18 140 lb (63.5 kg)   05/14/18 133 lb 6.4 oz (60.5 kg)   04/17/18 134 lb 12.8 oz (61.1 kg)                 Today's Medication Changes          These changes are accurate as of 6/26/18  8:21 AM.  If you have any questions, ask your nurse or doctor.               These medicines have changed or have updated prescriptions.        Dose/Directions    sertraline 50 MG tablet   Commonly known as:  ZOLOFT   This may have changed:    - how much to take  - how to take this  - when to take this  - additional instructions   Used for:  Moderate episode of recurrent major depressive disorder (H)   Changed by:  Ema Arechiga MD        Dose:  75 mg   Take 1.5 tablets (75 mg) by mouth daily   Quantity:  45 tablet   Refills:  5            Where to get your medicines      These medications were sent to [facility] [address] [place] [address] AT [address] [address]    Hours:  24-hours Phone:  [phone]     sertraline 50 MG tablet                Primary Care Provider Office Phone # Fax #    Ema Arechiga [phone]       [address] [facility] [address]        Equal Access to Services     FLORENTIN MENJIVAR AH: Hadii maryana mancia hadasho Soomaali, waaxda luqadaha, qaybta kaalmada adeegangelique, sienna edmonds. So [facility] [phone].    ATENCIÓN: Si habla español, tiene a contreras disposición servicios gratuitos de asistencia lingüística. Llame al [phone].    We comply with applicable federal civil rights laws and [place] laws. We do not  "discriminate on the basis of race, color, national origin, age, disability, sex, sexual orientation, or gender identity.            Thank you!     Thank you for choosing [facility]  for your care. Our goal is always to provide you with excellent care. Hearing back from our patients is one way we can continue to improve our services. Please take a few minutes to complete the written survey that you may receive in the mail after your visit with us. Thank you!             Your Updated Medication List - Protect others around you: Learn how to safely use, store and throw away your medicines at www.disposemymeds.org.          This list is accurate as of 6/26/18  8:21 AM.  Always use your most recent med list.                   Brand Name Dispense Instructions for use Diagnosis    needle (disp) 18G X 1\" Misc     25 each    Use to draw up hormones once weekly    Gender dysphoria in adult       Needle (Disp) 25G X 1\" Misc     50 each    Use to administer hormone IM once weekly    Gender dysphoria in adult       sertraline 50 MG tablet    ZOLOFT    45 tablet    Take 1.5 tablets (75 mg) by mouth daily    Moderate episode of recurrent major depressive disorder (H)       syringe (disposable) 1 ML Misc     25 each    Use once weekly to draw up hormones    Gender dysphoria in adult       testosterone cypionate 200 MG/ML injection    DEPOTESTOTERONE    2 mL    Inject 0.2 mLs (40 mg) into the muscle once a week    Gender dysphoria in adult       TYLENOL PO      Take 325 mg by mouth as needed for mild pain or fever          "

## 2018-06-27 ASSESSMENT — PATIENT HEALTH QUESTIONNAIRE - PHQ9: SUM OF ALL RESPONSES TO PHQ QUESTIONS 1-9: 12

## 2018-06-27 ASSESSMENT — ANXIETY QUESTIONNAIRES: GAD7 TOTAL SCORE: 8

## 2018-08-01 ENCOUNTER — MYC REFILL (OUTPATIENT)
Dept: FAMILY MEDICINE | Facility: CLINIC | Age: 28
End: 2018-08-01

## 2018-08-01 DIAGNOSIS — F64.0 GENDER DYSPHORIA IN ADULT: ICD-10-CM

## 2018-08-01 NOTE — TELEPHONE ENCOUNTER
"Message [facility] [employer] [facility] [employer]:  Original authorizing provider: MD Mo Roman John would like a refill of the following medications:  needle, disp, 18G X 1\" MISC [Ema Arechiga MD]  syringe, disposable, 1 ML MISC [Ema Arechiga MD]    Preferred pharmacy: Other - [facility] [address], [place] [address]    Comment:  Hello! My insurance made me switch my prescriptions to a [facility] to get filled, but I think that not all of them made it over there. I was able to  my 25G needle, but I'm out of my 18G needles and my syringes. I tried refilling through the [facility], but that didn't work either. Could I get my needle and syringe prescriptions sent to the [facility] at [address], [place] [address]? Thank you! -Mo  "

## 2018-08-15 DIAGNOSIS — F64.0 GENDER DYSPHORIA IN ADULT: ICD-10-CM

## 2018-08-15 LAB
% GRANULOCYTES: 58.1 %G (ref 40–75)
ALBUMIN SERPL-MCNC: 4.5 MG/DL (ref 3.8–5)
ALP SERPL-CCNC: 71.9 U/L (ref 31.7–110.5)
ALT SERPL-CCNC: 13.4 U/L (ref 0–45)
AST SERPL-CCNC: 16.1 U/L (ref 0–45)
BILIRUB SERPL-MCNC: 0.6 MG/DL (ref 0.2–1.3)
BILIRUBIN DIRECT: 0.1 MG/DL (ref 0.1–0.3)
CHOLEST SERPL-MCNC: 177.9 MG/DL (ref 0–200)
CHOLEST/HDLC SERPL: 2.9 {RATIO} (ref 0–5)
GRANULOCYTES #: 3.3 K/UL (ref 1.6–8.3)
HCT VFR BLD AUTO: 45.8 % (ref 35–47)
HDLC SERPL-MCNC: 60.7 MG/DL
HEMOGLOBIN: 14 G/DL (ref 11.7–15.7)
LDLC SERPL CALC-MCNC: 96 MG/DL (ref 0–129)
LYMPHOCYTES # BLD AUTO: 1.9 K/UL (ref 0.8–5.3)
LYMPHOCYTES NFR BLD AUTO: 33.3 %L (ref 20–48)
MCH RBC QN AUTO: 29.9 PG (ref 26.5–35)
MCHC RBC AUTO-ENTMCNC: 30.6 G/DL (ref 32–36)
MCV RBC AUTO: 97.7 FL (ref 78–100)
MID #: 0.5 K/UL (ref 0–2.2)
MID %: 8.6 %M (ref 0–20)
PLATELET # BLD AUTO: 287 K/UL (ref 150–450)
PROT SERPL-MCNC: 7.5 G/DL (ref 6.8–8.8)
RBC # BLD AUTO: 4.69 M/UL (ref 3.8–5.2)
TRIGL SERPL-MCNC: 104.3 MG/DL (ref 0–150)
VLDL CHOLESTEROL: 20.9 MG/DL (ref 7–32)
WBC # BLD AUTO: 5.7 K/UL (ref 4–11)

## 2018-08-19 ENCOUNTER — MYC MEDICAL ADVICE (OUTPATIENT)
Dept: FAMILY MEDICINE | Facility: CLINIC | Age: 28
End: 2018-08-19

## 2018-08-19 DIAGNOSIS — F64.0 GENDER DYSPHORIA IN ADULT: ICD-10-CM

## 2018-08-21 RX ORDER — TESTOSTERONE CYPIONATE 200 MG/ML
40 INJECTION, SOLUTION INTRAMUSCULAR WEEKLY
Qty: 4 ML | Refills: 0 | Status: SHIPPED | OUTPATIENT
Start: 2018-08-21 | End: 2018-08-28

## 2018-08-21 NOTE — TELEPHONE ENCOUNTER
I have reviewed pt's chart and this refill is appropriate.   Please provide printed rx to preceptor to sign, then provide to patient's pharmacy   and notify patient.     Ashely Butler MD

## 2018-08-28 ENCOUNTER — TELEPHONE (OUTPATIENT)
Dept: FAMILY MEDICINE | Facility: CLINIC | Age: 28
End: 2018-08-28

## 2018-08-28 ENCOUNTER — OFFICE VISIT (OUTPATIENT)
Dept: FAMILY MEDICINE | Facility: CLINIC | Age: 28
End: 2018-08-28
Payer: COMMERCIAL

## 2018-08-28 VITALS
OXYGEN SATURATION: 96 % | WEIGHT: 144.8 LBS | DIASTOLIC BLOOD PRESSURE: 72 MMHG | TEMPERATURE: 98.2 F | HEART RATE: 84 BPM | SYSTOLIC BLOOD PRESSURE: 113 MMHG

## 2018-08-28 DIAGNOSIS — R06.2 WHEEZING: Primary | ICD-10-CM

## 2018-08-28 DIAGNOSIS — F64.0 GENDER DYSPHORIA IN ADULT: ICD-10-CM

## 2018-08-28 LAB — TESTOST SERPL-MCNC: 808 NG/DL (ref 240–950)

## 2018-08-28 RX ORDER — INHALER, ASSIST DEVICES
SPACER (EA) MISCELLANEOUS
Qty: 1 EACH | Refills: 0 | Status: SHIPPED | OUTPATIENT
Start: 2018-08-28 | End: 2021-12-10

## 2018-08-28 RX ORDER — TESTOSTERONE CYPIONATE 200 MG/ML
40 INJECTION, SOLUTION INTRAMUSCULAR WEEKLY
Qty: 4 ML | Refills: 0 | Status: SHIPPED | OUTPATIENT
Start: 2018-11-01 | End: 2019-02-21

## 2018-08-28 RX ORDER — ALBUTEROL SULFATE 90 UG/1
2 AEROSOL, METERED RESPIRATORY (INHALATION) EVERY 6 HOURS PRN
Qty: 1 INHALER | Refills: 3 | Status: SHIPPED | OUTPATIENT
Start: 2018-08-28 | End: 2019-06-17

## 2018-08-28 ASSESSMENT — ANXIETY QUESTIONNAIRES
6. BECOMING EASILY ANNOYED OR IRRITABLE: NOT AT ALL
5. BEING SO RESTLESS THAT IT IS HARD TO SIT STILL: NOT AT ALL
1. FEELING NERVOUS, ANXIOUS, OR ON EDGE: NOT AT ALL
IF YOU CHECKED OFF ANY PROBLEMS ON THIS QUESTIONNAIRE, HOW DIFFICULT HAVE THESE PROBLEMS MADE IT FOR YOU TO DO YOUR WORK, TAKE CARE OF THINGS AT HOME, OR GET ALONG WITH OTHER PEOPLE: NOT DIFFICULT AT ALL
7. FEELING AFRAID AS IF SOMETHING AWFUL MIGHT HAPPEN: NOT AT ALL
3. WORRYING TOO MUCH ABOUT DIFFERENT THINGS: NOT AT ALL
2. NOT BEING ABLE TO STOP OR CONTROL WORRYING: NOT AT ALL
GAD7 TOTAL SCORE: 0

## 2018-08-28 ASSESSMENT — PATIENT HEALTH QUESTIONNAIRE - PHQ9: 5. POOR APPETITE OR OVEREATING: NOT AT ALL

## 2018-08-28 NOTE — PROGRESS NOTES
"          ERIC Hassan is a 28 year old individual that uses pronouns He/Him/His/Himself that presents today for follow up of:  masculinizing hormone therapy. Gender identity: Male    Any special concerns today?  no current concerns    Masculinizing therapy  5/14/18 Testosterone cypionate 40mg IM weekly started   6/26/18: zoloft dose increased from 50-75mg  8/15/18: [id number], monitoring labs wnl  On hormones?  YES   +++ Shot day of the week? Sunday        Due for labs?  Completed already        +++ Refills of meds needed?  4mo supply refilled 8/21  Changes noticed: deeper voice (no control over high notes), more body hair, lots of muscles, grown out of 2 sizes of clothes in the last 2 months. , small amount of facial hair.       ?Asthma  Worried has asthma, symptoms have not improved  Coughing more than usual  All the time  Worse with cardio exercise      ---    Past Surgical History:   Procedure Laterality Date     ENT SURGERY         Patient Active Problem List   Diagnosis     TODD (generalized anxiety disorder)     Moderate episode of recurrent major depressive disorder (H)     Gender dysphoria in adult     Healthcare maintenance       Current Outpatient Prescriptions   Medication Sig Dispense Refill     Acetaminophen (TYLENOL PO) Take 325 mg by mouth as needed for mild pain or fever       needle, disp, 18G X 1\" MISC Use to draw up hormones once weekly 25 each 3     Needle, Disp, 25G X 1\" MISC Use to administer hormone IM once weekly 50 each 3     sertraline (ZOLOFT) 50 MG tablet Take 1.5 tablets (75 mg) by mouth daily 45 tablet 5     syringe, disposable, 1 ML MISC Use once weekly to draw up hormones 25 each 3     testosterone cypionate (DEPOTESTOTERONE) 200 MG/ML injection Inject 0.2 mLs (40 mg) into the muscle once a week Needs 4 ml for 84 day supply 4 mL 0       History   Smoking Status     Never Smoker   Smokeless Tobacco     Never Used          Allergies   Allergen Reactions     Cats      Sneezing and watery " "eyes     Omnicef [Cefdinir] Hives     SOB. Swelling of face     Penicillins Hives       Problem, Medication and Allergy Lists were reviewed and updated if needed..         Review of Systems:      General    Fat redistribution: YES, more stomach fat    Weight change: YES, 11-15lb weight gain.     Exercise is weight lifing, doesn't like cardio and difficulty breathing HEENT    Voice change: YES     Cardiovascular (CV)    Chest Pains: YES    Shortness of breath: YES, attributes it to smoking (daily marijuana after 7pm) and binding (tries to bind less), also uses tape Chest    Decreased exercise tolerance:  no     Gastrointestinal (GI)    Abdominal pain: no    Change in appetite: no, thinks lower appetite from antidepressants compensated by high appetite of T - therefore no change.  Skin    Acne or oily skin: no    Change in hair: YES     Genitourinary ()    Abnormal vaginal bleeding: no, LMP 7/4    Change in libido: YES, increased    New sexual partners: YES    Sex with people with penises who make sperm    Condom use 100%, 2/2 Musculoskeletal    Leg pain or swelling: no     Psychiatric (Psych)    Depression: YES, PHQ9=8, muted ever present passive suicidal ideation \"i'd be better off dead\" no active plan. Feeling LESS suicidal than last visit. Feeling good on 75mg zoloft.     Anxiety/Panic: well controlled    Mood:  \"good\", ups and downs. Therapy going well                    Physical Exam:     Vitals:    08/28/18 0948   BP: 113/72   Pulse: 84   Temp: 98.2  F (36.8  C)   TempSrc: Oral   SpO2: 96%   Weight: 144 lb 12.8 oz (65.7 kg)     BMI= There is no height or weight on file to calculate BMI.   Wt Readings from Last 10 Encounters:   08/28/18 144 lb 12.8 oz (65.7 kg)   06/26/18 140 lb (63.5 kg)   05/14/18 133 lb 6.4 oz (60.5 kg)   04/17/18 134 lb 12.8 oz (61.1 kg)     Appearance: Male appearance and dress    GENERAL:: healthy, alert and no distress  RESP: lungs clear to auscultation - no rales, no rhonchi, no " wheezes  CV: regular rates and rhythm, normal S1 S2, no S3 or S4 and no murmur, no click or rub -  ABDOMEN: soft, no tenderness, no  hepatosplenomegaly, no masses, normal bowel sounds  Affect: Appropriate/mood-congruent           Labs:   Results from last visit:  Orders Only on 08/15/2018   Component Date Value Ref Range Status     Testosterone Total 08/15/2018 808* 8 - 60 ng/dL Final    Comment: This test was developed and its performance characteristics determined by the   [facility]  [facility]. It has   not been cleared or approved by the FDA. The laboratory is regulated under   CLIA as qualified to perform high-complexity testing. This test is used for   clinical purposes. It should not be regarded as investigational or for   research.       Albumin 08/15/2018 4.5  3.8 - 5.0 mg/dL Final     Alkaline Phosphatase 08/15/2018 71.9  31.7 - 110.5 U/L Final     ALT 08/15/2018 13.4  0.0 - 45.0 U/L Final     AST 08/15/2018 16.1  0.0 - 45.0 U/L Final     Bilirubin Direct 08/15/2018 0.1  0.1 - 0.3 mg/dL Final     Bilirubin Total 08/15/2018 0.6  0.2 - 1.3 mg/dL Final     Protein Total 08/15/2018 7.5  6.8 - 8.8 g/dL Final     WBC 08/15/2018 5.7  4.0 - 11.0 K/uL Final     Lymphocytes # 08/15/2018 1.9  0.8 - 5.3 K/uL Final     % Lymphocytes 08/15/2018 33.3  20.0 - 48.0 %L Final     Mid # 08/15/2018 0.5  0.0 - 2.2 K/uL Final     Mid % 08/15/2018 8.6  0.0 - 20.0 %M Final     GRANULOCYTES # 08/15/2018 3.3  1.6 - 8.3 K/uL Final     % Granulocytes 08/15/2018 58.1  40.0 - 75.0 %G Final     RBC 08/15/2018 4.69  3.80 - 5.20 M/uL Final     Hemoglobin 08/15/2018 14.0  11.7 - 15.7 g/dL Final     Hematocrit 08/15/2018 45.8  35.0 - 47.0 % Final     MCV 08/15/2018 97.7  78.0 - 100.0 fL Final     MCH 08/15/2018 29.9  26.5 - 35.0 pg Final     MCHC 08/15/2018 30.6* 32.0 - 36.0 g/dL Final     Platelets 08/15/2018 287.0  150.0 - 450.0 K/uL Final     Cholesterol 08/15/2018 177.9  0.0 - 200.0 mg/dL  Final     Cholesterol/HDL Ratio 08/15/2018 2.9  0.0 - 5.0 Final     HDL Cholesterol 08/15/2018 60.7  >40.0 mg/dL Final     LDL Cholesterol Calculated 08/15/2018 96  0 - 129 mg/dL Final     Triglycerides 08/15/2018 104.3  0.0 - 150.0 mg/dL Final     VLDL Cholesterol 08/15/2018 20.9  7.0 - 32.0 mg/dL Final       Assessment and Plan     Patient Instructions   Here is the plan from today's visit    1. Wheezing  No clear allergic triggers, plan to get spirometry to help with diagnosis  Someone will call you to schedule Spirometry  - Spirometry Pre/Post (Bronchodilation Response); Future  - albuterol (PROAIR HFA/PROVENTIL HFA/VENTOLIN HFA) 108 (90 Base) MCG/ACT inhaler; Inhale 2 puffs into the lungs every 6 hours as needed for shortness of breath / dyspnea or wheezing  Dispense: 1 Inhaler; Refill: 3  - Use spacer with inhaler every time    2. Gender dysphoria in adult  Doing really well at current dose.   No dose change, stay at 40mg IM weekly, follow up in 6 months  - testosterone cypionate (DEPOTESTOTERONE) 200 MG/ML injection; Inject 0.2 mLs (40 mg) into the muscle once a week Needs 4 ml for 84 day supply  Dispense: 4 mL; Refill: 0    3. MDD  Well controlled with 75mg zoloft, continue current dose unchanged    Follow up plan  Please make a clinic appointment for follow up with me (LACEY MEDEIROS) in 6  months for hormone follow up.      Contraception:   Condoms  Discussed if continues to have regular vaginal sex with sperm-havers that additional contraception is recommended. Also discussed use of emergency contraception in case of condom failure.    Counselled patient about controlled substances: Yes. Details: 6 months of refills only, paper script, script printed today to be filled in 3 months to last to next appt    Follow up:  Follow up in 6 months.  Results by joce PRN  Questions were elicited and answered.     I spent 25 min face to face with the patient and >50% was spent counselling the patient about the  above medical conditions, educating patient and discussing the recommendations and followup .    Ema Arechiga MD   of [facility]

## 2018-08-28 NOTE — MR AVS SNAPSHOT
After Visit Summary   8/28/2018    Mo Lopez    MRN: [id number]           Patient Information     Date Of Birth          1990        Visit Information        Provider Department      8/28/2018 9:40 AM Ema Medeiros MD [facility]        Today's Diagnoses     Wheezing    -  1    Gender dysphoria in adult          Care Instructions    Here is the plan from today's visit    1. Wheezing  Someone will call you to schedule Spirometry  - Spirometry Pre/Post (Bronchodilation Response); Future  - albuterol (PROAIR HFA/PROVENTIL HFA/VENTOLIN HFA) 108 (90 Base) MCG/ACT inhaler; Inhale 2 puffs into the lungs every 6 hours as needed for shortness of breath / dyspnea or wheezing  Dispense: 1 Inhaler; Refill: 3  - Use spacer with inhaler every time    2. Gender dysphoria in adult  No dose change, stay at 40mg IM weekly, follow up in 6 months  - testosterone cypionate (DEPOTESTOTERONE) 200 MG/ML injection; Inject 0.2 mLs (40 mg) into the muscle once a week Needs 4 ml for 84 day supply  Dispense: 4 mL; Refill: 0    Please call or return to clinic if your symptoms don't go away.    Follow up plan  Please make a clinic appointment for follow up with me (EMA MEDEIROS) in 6  months for hormone follow up.    Thank you for coming to [facility] today.  Lab Testing:  **If you had lab testing today and your results are reassuring or normal they will be mailed to you or sent through Decurate within 7 days.   **If the lab tests need quick action we will call you with the results.  The phone number we will call with results is # [phone] (home) . If this is not the best number please call our clinic and change the number.  Medication Refills:  If you need any refills please call your pharmacy and they will contact us.   If you need to  your refill at a new pharmacy, please contact the new pharmacy directly. The new pharmacy will help you get your medications transferred  faster.   Scheduling:  If you have any concerns about today's visit or wish to schedule another appointment please call our office during normal business hours [phone] (8-5:00 M-F)  If a referral was made to a [facility] and you don't get a call from central scheduling please call [phone].  If a Mammogram was ordered for you at [facility] call [phone] to schedule or change your appointment.  If you had an XRay/CT/Ultrasound/MRI ordered the number is [phone] to schedule or change your radiology appointment.   Medical Concerns:  If you have urgent medical concerns please call [phone] at any time of the day.    Ema Arechiga MD            Follow-ups after your visit        Future tests that were ordered for you today     Open Future Orders        Priority Expected Expires Ordered    Spirometry Pre/Post (Bronchodilation Response) Routine  10/12/2018 8/28/2018            Who to contact     Please call your clinic at [phone] to:    Ask questions about your health    Make or cancel appointments    Discuss your medicines    Learn about your test results    Speak to your doctor            Additional Information About Your Visit        Musicraiser Information     [employer] gives you secure access to your electronic health record. If you see a primary care provider, you can also send messages to your care team and make appointments. If you have questions, please call your primary care clinic.  If you do not have a primary care provider, please call [phone] and they will assist you.      [employer] is an electronic gateway that provides easy, online access to your medical records. With [employer], you can request a clinic appointment, read your test results, renew a prescription or communicate with your care team.     To access your existing account, please contact your [facility] or call [phone] for assistance.        Care  EveryWhere ID     This is your Care EveryWhere ID. This could be used by other organizations to access your [facility] medical records  [id number]        Your Vitals Were     Pulse Temperature Pulse Oximetry             84 98.2  F (36.8  C) (Oral) 96%          Blood Pressure from Last 3 Encounters:   08/28/18 113/72   06/26/18 114/72   05/14/18 121/75    Weight from Last 3 Encounters:   08/28/18 144 lb 12.8 oz (65.7 kg)   06/26/18 140 lb (63.5 kg)   05/14/18 133 lb 6.4 oz (60.5 kg)                 Today's Medication Changes          These changes are accurate as of 8/28/18 10:57 AM.  If you have any questions, ask your nurse or doctor.               Start taking these medicines.        Dose/Directions    albuterol 108 (90 Base) MCG/ACT inhaler   Commonly known as:  PROAIR HFA/PROVENTIL HFA/VENTOLIN HFA   Used for:  Wheezing   Started by:  Ema Arechiga MD        Dose:  2 puff   Inhale 2 puffs into the lungs every 6 hours as needed for shortness of breath / dyspnea or wheezing   Quantity:  1 Inhaler   Refills:  3         These medicines have changed or have updated prescriptions.        Dose/Directions    testosterone cypionate 200 MG/ML injection   Commonly known as:  DEPOTESTOTERONE   This may have changed:  These instructions start on 11/1/2018. If you are unsure what to do until then, ask your doctor or other care provider.   Used for:  Gender dysphoria in adult   Changed by:  Ema Arechiga MD        Dose:  40 mg   Start taking on:  11/1/2018   Inject 0.2 mLs (40 mg) into the muscle once a week Needs 4 ml for 84 day supply   Quantity:  4 mL   Refills:  0            Where to get your medicines      These medications were sent to [facility] [place], [place] - [address], [facility] [address]     Phone:  [phone]     albuterol 108 (90 Base) MCG/ACT inhaler         Some of these will need a paper prescription and others can be bought over the counter.  Ask your  "nurse if you have questions.     Bring a paper prescription for each of these medications     testosterone cypionate 200 MG/ML injection                Primary Care Provider Office Phone # Fax #    Ema Arechiga [phone]       [address] [facility] [address]        Equal Access to Services     JOSELIN MENJIVAR : Hadii maryana mancia hadkadieo Soomaali, waaxda luqadaha, qaybta kaalmada adeegyada, waxsaira cardona marcolencho chávezleenaclara edmonds. So [facility] [phone].    ATENCIÓN: Si habla español, tiene a contreras disposición servicios gratuitos de asistencia lingüística. Tash al [phone].    We comply with applicable federal civil rights laws and [place] laws. We do not discriminate on the basis of race, color, national origin, age, disability, sex, sexual orientation, or gender identity.            Thank you!     Thank you for choosing [facility]  for your care. Our goal is always to provide you with excellent care. Hearing back from our patients is one way we can continue to improve our services. Please take a few minutes to complete the written survey that you may receive in the mail after your visit with us. Thank you!             Your Updated Medication List - Protect others around you: Learn how to safely use, store and throw away your medicines at www.disposemymeds.org.          This list is accurate as of 8/28/18 10:57 AM.  Always use your most recent med list.                   Brand Name Dispense Instructions for use Diagnosis    albuterol 108 (90 Base) MCG/ACT inhaler    PROAIR HFA/PROVENTIL HFA/VENTOLIN HFA    1 Inhaler    Inhale 2 puffs into the lungs every 6 hours as needed for shortness of breath / dyspnea or wheezing    Wheezing       needle (disp) 18G X 1\" Misc     25 each    Use to draw up hormones once weekly    Gender dysphoria in adult       Needle (Disp) 25G X 1\" Misc     50 each    Use to administer hormone IM once weekly    Gender dysphoria in adult       " sertraline 50 MG tablet    ZOLOFT    45 tablet    Take 1.5 tablets (75 mg) by mouth daily    Moderate episode of recurrent major depressive disorder (H)       syringe (disposable) 1 ML Misc     25 each    Use once weekly to draw up hormones    Gender dysphoria in adult       testosterone cypionate 200 MG/ML injection   Start taking on:  11/1/2018    DEPOTESTOTERONE    4 mL    Inject 0.2 mLs (40 mg) into the muscle once a week Needs 4 ml for 84 day supply    Gender dysphoria in adult       TYLENOL PO      Take 325 mg by mouth as needed for mild pain or fever

## 2018-08-28 NOTE — PATIENT INSTRUCTIONS
Here is the plan from today's visit    1. Wheezing  Someone will call you to schedule Spirometry  - Spirometry Pre/Post (Bronchodilation Response); Future  - albuterol (PROAIR HFA/PROVENTIL HFA/VENTOLIN HFA) 108 (90 Base) MCG/ACT inhaler; Inhale 2 puffs into the lungs every 6 hours as needed for shortness of breath / dyspnea or wheezing  Dispense: 1 Inhaler; Refill: 3  - Use spacer with inhaler every time    2. Gender dysphoria in adult  No dose change, stay at 40mg IM weekly, follow up in 6 months  - testosterone cypionate (DEPOTESTOTERONE) 200 MG/ML injection; Inject 0.2 mLs (40 mg) into the muscle once a week Needs 4 ml for 84 day supply  Dispense: 4 mL; Refill: 0    Please call or return to clinic if your symptoms don't go away.    Follow up plan  Please make a clinic appointment for follow up with me (LACEY MEDEIROS) in 6  months for hormone follow up.    Thank you for coming to [facility] today.  Lab Testing:  **If you had lab testing today and your results are reassuring or normal they will be mailed to you or sent through D[employer] within 7 days.   **If the lab tests need quick action we will call you with the results.  The phone number we will call with results is # [phone] (home) . If this is not the best number please call our clinic and change the number.  Medication Refills:  If you need any refills please call your pharmacy and they will contact us.   If you need to  your refill at a new pharmacy, please contact the new pharmacy directly. The new pharmacy will help you get your medications transferred faster.   Scheduling:  If you have any concerns about today's visit or wish to schedule another appointment please call our office during normal business hours [phone] (8-5:00 M-F)  If a referral was made to a [facility] and you don't get a call from central scheduling please call [phone].  If a Mammogram was ordered for you at [facility] call  [phone] to schedule or change your appointment.  If you had an XRay/CT/Ultrasound/MRI ordered the number is [phone] to schedule or change your radiology appointment.   Medical Concerns:  If you have urgent medical concerns please call [phone] at any time of the day.    Ema Arechiga MD

## 2018-08-28 NOTE — TELEPHONE ENCOUNTER
Pt is requesting a spacer to use with his albuterol inhaler.  Can you please send a rx for an Optichamber Spacer?    Pharmacy cannot dispense/bill through patient's insurance without a prescription!    Thank you,  Margaret Quinn, PharmD  [facility]  [phone]

## 2018-08-29 ASSESSMENT — ANXIETY QUESTIONNAIRES: GAD7 TOTAL SCORE: 0

## 2018-08-29 ASSESSMENT — PATIENT HEALTH QUESTIONNAIRE - PHQ9: SUM OF ALL RESPONSES TO PHQ QUESTIONS 1-9: 8

## 2018-10-02 ENCOUNTER — MYC MEDICAL ADVICE (OUTPATIENT)
Dept: FAMILY MEDICINE | Facility: CLINIC | Age: 28
End: 2018-10-02

## 2018-10-02 DIAGNOSIS — F64.0 GENDER DYSPHORIA IN ADULT: Primary | ICD-10-CM

## 2018-10-12 ENCOUNTER — DOCUMENTATION ONLY (OUTPATIENT)
Dept: FAMILY MEDICINE | Facility: CLINIC | Age: 28
End: 2018-10-12

## 2018-10-16 DIAGNOSIS — F33.1 MODERATE EPISODE OF RECURRENT MAJOR DEPRESSIVE DISORDER (H): ICD-10-CM

## 2018-10-16 NOTE — TELEPHONE ENCOUNTER

## 2018-10-17 DIAGNOSIS — F33.1 MODERATE EPISODE OF RECURRENT MAJOR DEPRESSIVE DISORDER (H): ICD-10-CM

## 2018-10-17 NOTE — TELEPHONE ENCOUNTER

## 2018-11-11 ENCOUNTER — MYC REFILL (OUTPATIENT)
Dept: FAMILY MEDICINE | Facility: CLINIC | Age: 28
End: 2018-11-11

## 2018-11-11 DIAGNOSIS — F64.0 GENDER DYSPHORIA IN ADULT: ICD-10-CM

## 2018-11-12 NOTE — TELEPHONE ENCOUNTER
"[facility]  Original authorizing provider: Ema Arechiga MD    Mo He/Him RACHELEusebio Lopez would like a refill of the following medications:  Needle, Disp, 25G X 1\" MISC [Ema Arechiga MD]    Preferred pharmacy: [id number] - [place], [place] - [address]    Comment:  Hello! I moved from the [facility] pharmacy to [facility], per my insurance's request, and in my move, I think I somehow lost my 25G needle prescription. I haven't been able to order any recently, and I just noticed that I'm out! Could you send my 25G needle prescription to the [facility] at [address] in [place]? Thank you! -Mo  "

## 2018-11-14 ENCOUNTER — MYC MEDICAL ADVICE (OUTPATIENT)
Dept: FAMILY MEDICINE | Facility: CLINIC | Age: 28
End: 2018-11-14

## 2018-11-15 ENCOUNTER — TELEPHONE (OUTPATIENT)
Dept: FAMILY MEDICINE | Facility: CLINIC | Age: 28
End: 2018-11-15

## 2018-11-15 DIAGNOSIS — N83.202 LEFT OVARIAN CYST: Primary | ICD-10-CM

## 2018-11-15 NOTE — TELEPHONE ENCOUNTER
Message routed to PCP. Please review and advise restriction and limitations, diagnosis, and estimated return to work date.     Once completed, please route to medical records to follow up with requested information and any additional information needing to be sent from [facility].     Анна Bazan RN

## 2018-11-15 NOTE — TELEPHONE ENCOUNTER
Called and spoke with patient   First day of work missed: 11/12/18  Seen at [facility] ED on 11/13/18  Diagnosed with L ovarian cyst on CT  Prescribed percocet PRN, ibuprofen PRN, told to rest  Overall feeling a little better today than yesterday.   Hoping to return to work 11/19 at the earliest, 11/21 at the latest.   Works as a , sits at a desk    Link to DIANE form sent to patient via [email or web address]. Requesting they complete an DIANE for [facility] to  [employer] and for [facility] to [facility]    Once ROIs received I will call [facility] and provide them with the requested information.     -Ema Arechiga MD 11/15/2018 2:56 PM

## 2018-11-16 NOTE — TELEPHONE ENCOUNTER
ROIs received, sent to HIM for scanning. Called and spoke with [employer] claims rep on 11/16/2018 10:41 AM   -MD King

## 2018-11-17 ENCOUNTER — TELEPHONE (OUTPATIENT)
Dept: FAMILY MEDICINE | Facility: CLINIC | Age: 28
End: 2018-11-17

## 2018-11-17 NOTE — TELEPHONE ENCOUNTER
PHONE CALL NOTE  I contacted Mo Lopez regarding Return call back     Paged from Roommate Avelino [phone], message read calling for Mo Lopez for increased pain and bleeding    Called with no answer x2, no voicemail available    Will try one more time later in day, otherwise will call if receive another page.    11/17/2018  2:47 PM    Message returned by Enoch Pineda  Patient: Mo Lopez   Phone number-  [phone] (home)

## 2018-11-29 ENCOUNTER — DOCUMENTATION ONLY (OUTPATIENT)
Dept: FAMILY MEDICINE | Facility: CLINIC | Age: 28
End: 2018-11-29

## 2018-11-29 NOTE — PROGRESS NOTES
"When opening a documentation only encounter, be sure to enter in \"Chief Complaint\" Forms and in \" Comments\" Title of form, description if needed.    Mo He/Him is a 28 year old  male  Form received via: Fax  Form now resides in: Provider Ready    Ludivina Arevalo CMA      Form has been completed by provider.     Form sent out via: Fax to [facility] at Fax Number: [phone]  Patient informed: n/a  Output date: November 29, 2018    Ludivina Arevalo CMA      **Please close the encounter**      "

## 2018-12-04 ENCOUNTER — DOCUMENTATION ONLY (OUTPATIENT)
Dept: FAMILY MEDICINE | Facility: CLINIC | Age: 28
End: 2018-12-04

## 2018-12-04 ENCOUNTER — OFFICE VISIT (OUTPATIENT)
Dept: FAMILY MEDICINE | Facility: CLINIC | Age: 28
End: 2018-12-04
Payer: COMMERCIAL

## 2018-12-04 VITALS
RESPIRATION RATE: 20 BRPM | WEIGHT: 148.4 LBS | SYSTOLIC BLOOD PRESSURE: 111 MMHG | DIASTOLIC BLOOD PRESSURE: 73 MMHG | OXYGEN SATURATION: 96 % | HEART RATE: 86 BPM | TEMPERATURE: 98.5 F

## 2018-12-04 DIAGNOSIS — F64.0 GENDER DYSPHORIA IN ADULT: ICD-10-CM

## 2018-12-04 DIAGNOSIS — Z23 ENCOUNTER FOR IMMUNIZATION: ICD-10-CM

## 2018-12-04 DIAGNOSIS — N83.202 LEFT OVARIAN CYST: Primary | ICD-10-CM

## 2018-12-04 NOTE — PROGRESS NOTES
"When opening a documentation only encounter, be sure to enter in \"Chief Complaint\" Forms and in \" Comments\" Title of form, description if needed.    Mo He/Him is a 28 year old  male  Form received via: Fax  Form now resides in: Provider Ready    Cher Marshall CMA        Form has been completed by provider.     Form sent out via: Fax to [phone]  Patient informed: No, Reason:fax confirmed  Output date: December 26, 2018    Cher Marshall CMA      **Please close the encounter**              "

## 2018-12-04 NOTE — PROGRESS NOTES
Form has been completed by provider.     Form sent out via: Fax to [phone]  Patient informed: No, Reason:fax confirmed  Output date: December 4, 2018    Cher Marshall CMA      **Please close the encounter**

## 2018-12-04 NOTE — PATIENT INSTRUCTIONS
"To decrease inflammation and \"reset\" the colon  Bananas  Rice  Applesauce  Toast (+/-)  Decrease or eliminate dairy for a week  Avoid spicy foods  +/- fiber (as tolerated) - green leafy vegetables  Lots of water/juice      Ok to split testosterone dose to 20mg (0.1ml) twice weekly to avoid high/lows. Otherwise continue at 40mg (0.2ml) IM weekly    Follow up for hormone visit in February 2019  "

## 2018-12-04 NOTE — MR AVS SNAPSHOT
"              After Visit Summary   12/4/2018    Mo Lopez    MRN: [id number]           Patient Information     Date Of Birth          1990        Visit Information        Provider Department      12/4/2018 9:40 AM Ema Arechiga MD [facility]        Today's Diagnoses     Left ovarian cyst    -  1    Encounter for immunization        Gender dysphoria in adult          Care Instructions    To decrease inflammation and \"reset\" the colon  Bananas  Rice  Applesauce  Toast (+/-)  Decrease or eliminate dairy for a week  Avoid spicy foods  +/- fiber (as tolerated) - green leafy vegetables  Lots of water/juice      Ok to split testosterone dose to 20mg (0.1ml) twice weekly to avoid high/lows. Otherwise continue at 40mg (0.2ml) IM weekly    Follow up for hormone visit in February 2019          Follow-ups after your visit        Your next 10 appointments already scheduled     Yosef 15, 2019  2:00 PM CST   (Arrive by 1:45 PM)   [place] with Jai Edward MD   [facility] ([facility])    [address] [place] [id number] [phone]              Who to contact     Please call your clinic at [phone] to:    Ask questions about your health    Make or cancel appointments    Discuss your medicines    Learn about your test results    Speak to your doctor            Additional Information About Your Visit        [employer] Information     [employer] gives you secure access to your electronic health record. If you see a primary care provider, you can also send messages to your care team and make appointments. If you have questions, please call your primary care clinic.  If you do not have a primary care provider, please call [phone] and they will assist you.      [employer] is an electronic gateway that provides easy, online access to your medical records. With [employer], you can request a clinic appointment, read your " test results, renew a prescription or communicate with your care team.     To access your existing account, please contact your [facility] or call [phone] for assistance.        Care EveryWhere ID     This is your Care EveryWhere ID. This could be used by other organizations to access your [facility] medical records  [id number]        Your Vitals Were     Pulse Temperature Respirations Pulse Oximetry          86 98.5  F (36.9  C) (Oral) 20 96%         Blood Pressure from Last 3 Encounters:   12/04/18 111/73   08/28/18 113/72   06/26/18 114/72    Weight from Last 3 Encounters:   12/04/18 148 lb 6.4 oz (67.3 kg)   08/28/18 144 lb 12.8 oz (65.7 kg)   06/26/18 140 lb (63.5 kg)              We Performed the Following     ADMIN VACCINE, INITIAL     TDAP VACCINE (BOOSTRIX)        Primary Care Provider Office Phone # Fax #    Emajesika Arechiga [phone]       [address] [facility] [address]        Equal Access to Services     [facility]: Hadii aad ku hadasho Soomaali, waaxda luqadaha, qaybta kaalmada adeegyada, waxsaira cardona haybelkis brito . So [facility] [phone].    ATENCIÓN: Si habla español, tiene a contreras disposición servicios gratuitos de asistencia lingüística. Lorame al [phone].    We comply with applicable federal civil rights laws and [place] laws. We do not discriminate on the basis of race, color, national origin, age, disability, sex, sexual orientation, or gender identity.            Thank you!     Thank you for choosing \[facility]\"" [facility]  for your care. Our goal is always to provide you with excellent care. Hearing back from our patients is one way we can continue to improve our services. Please take a few minutes to complete the written survey that you may receive in the mail after your visit with us. Thank you!             Your Updated Medication List - Protect others around you: Learn how to safely use, store and  "throw away your medicines at www.disposemymeds.org.          This list is accurate as of 12/4/18 10:15 AM.  Always use your most recent med list.                   Brand Name Dispense Instructions for use Diagnosis    albuterol 108 (90 Base) MCG/ACT inhaler    PROAIR HFA/PROVENTIL HFA/VENTOLIN HFA    1 Inhaler    Inhale 2 puffs into the lungs every 6 hours as needed for shortness of breath / dyspnea or wheezing    Wheezing       needle (disp) 18G X 1\" Misc     25 each    Use to draw up hormones once weekly    Gender dysphoria in adult       Needle (Disp) 25G X 1\" Misc     50 each    Use to administer hormone IM once weekly    Gender dysphoria in adult       sertraline 50 MG tablet    ZOLOFT    45 tablet    Take 1.5 tablets (75 mg) by mouth daily    Moderate episode of recurrent major depressive disorder (H)       spacer holding chamber     1 each    Holding/spacer device to use with inhaler.    Wheezing       syringe (disposable) 1 ML Misc     25 each    Use once weekly to draw up hormones    Gender dysphoria in adult       testosterone cypionate 200 MG/ML injection    DEPOTESTOSTERONE    4 mL    Inject 0.2 mLs (40 mg) into the muscle once a week Needs 4 ml for 84 day supply    Gender dysphoria in adult       TYLENOL PO      Take 325 mg by mouth as needed for mild pain or fever          "

## 2018-12-04 NOTE — PROGRESS NOTES
HPI       Mo Lopez is a 28 year old  who presents for   Chief Complaint   Patient presents with     Cyst     left side tender to touch, post 2-3weeks     Left Ovarian cyst  Overall feeling better.   Whole lower abdomen   Constant digestive pain, can feel food moving through after he eats, pain, cramping, improves with BM. BM 2-3 times per day, normal to soft, no change in smell - this is baseline frequency. No dietary restrictions. Sugary foods make the cramping worse, also only craving sugary foods.   Can do anything, just not for very long. Able to stand, go for walks. Planning to return to work tomorrow.   Vaginal bleeding 1 week ago, previous to that July 2018. Hasn't missed any testosterone doses.    Mood: feels cycle of testosterone, more energy beginning of the week. Shot day is Sunday. Wondering about testosterone pellets, ok waiting for Dr. Love to finish training.     Really sensitive to opioids, full body anxiety as coming off  One pill daily at night, when stopped taking it felt full body anxiety, restless legs but everywhere for 4-5 days.      +++++++    Problem, Medication and Allergy Lists were reviewed and updated if needed..    Patient is an established patient of this clinic..         Review of Systems:   Review of Systems         Physical Exam:     Vitals:    12/04/18 0946   BP: 111/73   Pulse: 86   Resp: 20   Temp: 98.5  F (36.9  C)   TempSrc: Oral   SpO2: 96%   Weight: 148 lb 6.4 oz (67.3 kg)     There is no height or weight on file to calculate BMI.     Physical Exam   Constitutional: He appears well-developed and well-nourished. No distress.   HENT:   Head: Normocephalic and atraumatic.   Eyes: Conjunctivae are normal.   Cardiovascular: Normal rate, regular rhythm, normal heart sounds and intact distal pulses.    No murmur heard.  Pulmonary/Chest: Effort normal and breath sounds normal. No respiratory distress. He has no wheezes.   Abdominal: Normal appearance. There  "is no hepatosplenomegaly. There is tenderness in the right lower quadrant, suprapubic area, left upper quadrant and left lower quadrant. There is no rebound and no CVA tenderness.       Neurological: He is alert.   Skin: Skin is warm and dry. No rash noted. He is not diaphoretic. No erythema. No pallor.   Psychiatric: He has a normal mood and affect. His behavior is normal. Judgment and thought content normal.   Vitals reviewed.      Results:   No testing ordered today    Assessment and Plan        Bloating and colonic irritation s/p ovarian cyst  - ok to return to work tomorrow  - percocet added to allergy list with note made about hypersensitivity/anxiety response  To decrease inflammation and \"reset\" the colon  Bananas  Rice  Applesauce  Toast (+/-)  Decrease or eliminate dairy for a week  Avoid spicy foods  +/- fiber (as tolerated) - green leafy vegetables  Lots of water/juice      Ok to split testosterone dose to 20mg (0.1ml) twice weekly to avoid high/lows. Otherwise continue at 40mg (0.2ml) IM weekly    Follow up for hormone visit in February 2019    Options for treatment and follow-up care were reviewed with the patient. Mo Lopez  engaged in the decision making process and verbalized understanding of the options discussed and agreed with the final plan.    I spent 25 min face to face with the patient and >50% was spent counselling the patient about the above medical conditions, educating patient and discussing the recommendations and followup .    Ema Arechiga MD  [facility] [place]   "

## 2018-12-05 ASSESSMENT — ASTHMA QUESTIONNAIRES: ACT_TOTALSCORE: 18

## 2018-12-17 ENCOUNTER — TELEPHONE (OUTPATIENT)
Dept: FAMILY MEDICINE | Facility: CLINIC | Age: 28
End: 2018-12-17

## 2018-12-17 NOTE — TELEPHONE ENCOUNTER
[facility] phone call message- patient requesting form status:    Type of Form: STD for work     Given to: Provider    Submitted: within 10 business days     Date Submitted: last week     Date Needed by: ASAP    Additional Details: The patient is asking for update on paperwork for STD for their job.  They returned back to work on 12/5/18 and need the documentation stating they were out for 3 weeks prior to that.  Please fax any documents to [phone] to [facility]..  Please call the patient with questions.    Advised Patient it may take up to 7 - 10 business days: Yes    OK to leave a message on voice mail? Yes    Primary language: English      needed? No    Call taken on December 17, 2018 at 2:12 PM by Germaine Arizmendi    Route to RACHEL SCALES (color team) P[employer]

## 2018-12-26 ENCOUNTER — DOCUMENTATION ONLY (OUTPATIENT)
Dept: FAMILY MEDICINE | Facility: CLINIC | Age: 28
End: 2018-12-26

## 2018-12-26 NOTE — PROGRESS NOTES
"When opening a documentation only encounter, be sure to enter in \"Chief Complaint\" Forms and in \" Comments\" Title of form, description if needed.    Mo He/Him is a 28 year old  male  Form received via: Fax  Form now resides in: Provider Ready    Cher Marshall CMA                Form has been completed by provider.     Form sent out via: Fax to [phone]   Patient informed: No, Reason:fax confirmed  Output date: December 26, 2018    Cher Marshall CMA      **Please close the encounter**      "

## 2019-01-07 ENCOUNTER — MEDICAL CORRESPONDENCE (OUTPATIENT)
Dept: HEALTH INFORMATION MANAGEMENT | Facility: CLINIC | Age: 29
End: 2019-01-07

## 2019-01-15 ENCOUNTER — OFFICE VISIT (OUTPATIENT)
Dept: PLASTIC SURGERY | Facility: CLINIC | Age: 29
End: 2019-01-15
Attending: FAMILY MEDICINE
Payer: COMMERCIAL

## 2019-01-15 ENCOUNTER — PATIENT OUTREACH (OUTPATIENT)
Dept: PLASTIC SURGERY | Facility: CLINIC | Age: 29
End: 2019-01-15

## 2019-01-15 VITALS
WEIGHT: 148.1 LBS | DIASTOLIC BLOOD PRESSURE: 71 MMHG | OXYGEN SATURATION: 95 % | TEMPERATURE: 98.4 F | BODY MASS INDEX: 24.68 KG/M2 | HEART RATE: 87 BPM | HEIGHT: 65 IN | SYSTOLIC BLOOD PRESSURE: 128 MMHG

## 2019-01-15 DIAGNOSIS — Z12.31 VISIT FOR SCREENING MAMMOGRAM: ICD-10-CM

## 2019-01-15 DIAGNOSIS — F64.0 GENDER DYSPHORIA IN ADULT: Primary | ICD-10-CM

## 2019-01-15 ASSESSMENT — MIFFLIN-ST. JEOR: SCORE: 1573.66

## 2019-01-15 NOTE — PROGRESS NOTES
"REFERRING PROVIDER: Ema Arechiga    REASON FOR CONSULTATION: Gender dysphoria, requesting top surgery.    HPI: Patient is a 29-year-old trans-man who prefers he him pronouns, referred to me by Ema Arechiga for possible top surgery.  Patient has been considering undergoing surgery for the past year.  He has history of binding their chest over the year, but this resulted in development of or exacerbation of asthma.  By undergoing top surgery, patient would like to better align their physical body with her chosen gender identity.  Patient transitioned a year ago.  New name is Mo.  Has been on hormone therapy for the past 9 months.  Denies any previous breast history.    MEDS:   Prior to Admission medications    Medication Sig Start Date End Date Taking? Authorizing Provider   Acetaminophen (TYLENOL PO) Take 325 mg by mouth as needed for mild pain or fever   Yes Reported, Patient   albuterol (PROAIR HFA/PROVENTIL HFA/VENTOLIN HFA) 108 (90 Base) MCG/ACT inhaler Inhale 2 puffs into the lungs every 6 hours as needed for shortness of breath / dyspnea or wheezing 8/28/18  Yes Ema Arechiga MD   needle, disp, 18G X 1\" MISC Use to draw up hormones once weekly 8/1/18  Yes Ema Arechiga MD   Needle, Disp, 25G X 1\" MISC Use to administer hormone IM once weekly 11/12/18  Yes Ema Arechiga MD   sertraline (ZOLOFT) 50 MG tablet Take 1.5 tablets (75 mg) by mouth daily 10/18/18  Yes Ema Arechiga MD   spacer (OPTICHAMBER VICENTE) holding chamber Holding/spacer device to use with inhaler. 8/28/18  Yes Ema Arechiga MD   syringe, disposable, 1 ML MISC Use once weekly to draw up hormones 8/1/18  Yes Ema Arechiga MD   testosterone cypionate (DEPOTESTOTERONE) 200 MG/ML injection Inject 0.2 mLs (40 mg) into the muscle once a week Needs 4 ml for 84 day supply 11/1/18  Yes Ema Arechiga MD       ALLERGIES:   Allergies   Allergen Reactions     Cats      Sneezing and watery eyes     " "Omnicef [Cefdinir] Hives     SOB. Swelling of face     Penicillins Hives     Percocet [Oxycodone-Acetaminophen] Anxiety     Bad full body restlessness and anxiety when stopped 5mg/day       PMH:   Past Medical History:   Diagnosis Date     Depressive disorder    Anxiety    PSH:   Past Surgical History:   Procedure Laterality Date     ENT SURGERY     Frenulectomy.    SH:   Social History     Tobacco Use     Smoking status: Never Smoker     Smokeless tobacco: Never Used   Substance Use Topics     Alcohol use: Yes     Comment: occasionally   .    FH:   Family History   Problem Relation Age of Onset     Diabetes Mother         preDM tx with gastric bypass     Alcoholism Mother      Depression Mother      Heart Disease Father         open heart surgery     Alcoholism Father      Depression Father      Breast Cancer Maternal Grandmother      Cancer Paternal Grandmother      Hypertension No family hx of      Hyperlipidemia No family hx of      Cerebrovascular Disease No family hx of        ROS: Denies chest pain, shortness of breath, MI, CVA, diabetes, DVT, PE, and bleeding disorders.    PHYSICAL EXAMINATION: /71   Pulse 87   Temp 98.4  F (36.9  C) (Oral)   Ht 1.651 m (5' 5\")   Wt 67.2 kg (148 lb 1.6 oz)   SpO2 95%   BMI 24.65 kg/m    General: No acute distress.  Appears masculine.  Chest examination was performed in the presence of a chaperone.  On examination, patient has grade 1 ptosis on the right and grade 2 ptosis on the left.  Pectoralis muscle is intact bilaterally.  Notch to nipple distance is 19 cm on the right and 21 cm area where diameter is 3 cm bilaterally.  Nipple to fold distance is 3.5 cm bilaterally.  Base diameter is 14 cm bilaterally.  There is no palpable breast mass.  There is no nipple discharge.    ASSESSMENT: Gender dysphoria, requesting top surgery.    PLAN: We received a letter of support from a mental health professional.  I am requesting a baseline screening mammogram " given the family history of breast cancer.  With these obtained, patient is a potential candidate for bilateral simple complete mastectomy with free nipple graft reconstruction or bilateral reduction mammoplasty with inferior pedicle as a form of top surgery to masculinize the chest.  I explained both procedures in detail today.  They can be performed as an outpatient at the ambulatory surgery center.  I explained the differences that the inferior pedicle reduction mammoplasty technique leaves some tissue behind with a chance of viable nipples.  I explained the risks with surgery to include bleeding, infection, injury to surrounding structures, fluid collection, nipple or nipple graft loss, nipple sensory loss, change in nipple size, wound healing difficulties, contour deformity, dog ears, asymmetry, and need for revision surgery.  Patient accepts these risks and wishes to proceed with surgery.  We will wait for the mammogram results.    Total time spent with patient was 30 min of which greater than 50% was in counseling.

## 2019-01-15 NOTE — PROGRESS NOTES
[facility]:  Care Coordination Note     SITUATION   Patient is a 29 year old who is receiving support for:  Clinic Care Coordination - Initial  .    BACKGROUND     Pt requesting top surgery with Dr. Edward, initial consultation 1/15/19.    ASSESSMENT     Surgery              [facility] Assessment  [facility] ([facility]) Enrollment: Enrolled  Patient has a therapist: Yes  Name of therapist: Cici Melton  Therapist's phone number: [phone]  Letter of support #1: Received  Letter #1 Date: 01/07/19  Surgery being considered: Yes  Mastectomy: Yes          PLAN          Nursing Interventions:   [facility] program and services discussed with patient. Educational packet provided and reviewed with patient. Process for accessing surgery discussed including: Letters of support, if surgeon requests mammogram, PA insurance process, surgery scheduling, and approximate timeline. Pt has Letter of support in chart, which is adequate for PA. DIANE and photography consent signed by patient.    Follow-up plan:  If pt needs mammogram, complete procedure and inform trans care coordinator when complete. Then PA will be submitted.        Erik Galindo

## 2019-01-15 NOTE — NURSING NOTE
"Chief Complaint   Patient presents with     Consult     NEW Top surgery       Vitals:    01/15/19 1343   BP: 128/71   Pulse: 87   Temp: 98.4  F (36.9  C)   TempSrc: Oral   SpO2: 95%   Weight: 67.2 kg (148 lb 1.6 oz)   Height: 1.651 m (5' 5\")       Body mass index is 24.65 kg/m .    Boo Paniagua, EMT on 1/15/2019 at 1:47 PM                     "

## 2019-01-15 NOTE — LETTER
"1/15/2019       RE: Mo Lopez  [address]  [place] [address]     Dear Colleague,    Thank you for referring your patient, Mo Lopez, to the [facility]ON[facility] SURGERY at [facility]. Please see a copy of my visit note below.    REFERRING PROVIDER: Ema Arechiga    REASON FOR CONSULTATION: Gender dysphoria, requesting top surgery.    HPI: Patient is a 29-year-old trans-man who prefers he him pronouns, referred to me by Ema Arechiga for possible top surgery.  Patient has been considering undergoing surgery for the past year.  He has history of binding their chest over the year, but this resulted in development of or exacerbation of asthma.  By undergoing top surgery, patient would like to better align their physical body with her chosen gender identity.  Patient transitioned a year ago.  New name is Mo.  Has been on hormone therapy for the past 9 months.  Denies any previous breast history.    MEDS:   Prior to Admission medications    Medication Sig Start Date End Date Taking? Authorizing Provider   Acetaminophen (TYLENOL PO) Take 325 mg by mouth as needed for mild pain or fever   Yes Reported, Patient   albuterol (PROAIR HFA/PROVENTIL HFA/VENTOLIN HFA) 108 (90 Base) MCG/ACT inhaler Inhale 2 puffs into the lungs every 6 hours as needed for shortness of breath / dyspnea or wheezing 8/28/18  Yes Ema Arechiga MD   needle, disp, 18G X 1\" MISC Use to draw up hormones once weekly 8/1/18  Yes Ema Arechiga MD   Needle, Disp, 25G X 1\" MISC Use to administer hormone IM once weekly 11/12/18  Yes Ema Arechiga MD   sertraline (ZOLOFT) 50 MG tablet Take 1.5 tablets (75 mg) by mouth daily 10/18/18  Yes Ema Arechiga MD   spacer (OPTICHAMBER VICENTE) holding chamber Holding/spacer device to use with inhaler. 8/28/18  Yes Ema Arechiga MD   syringe, disposable, 1 ML MISC Use once weekly to draw up hormones " "8/1/18  Yes Ema Arechiga MD   testosterone cypionate (DEPOTESTOTERONE) 200 MG/ML injection Inject 0.2 mLs (40 mg) into the muscle once a week Needs 4 ml for 84 day supply 11/1/18  Yes Ema Arechiga MD       ALLERGIES:   Allergies   Allergen Reactions     Cats      Sneezing and watery eyes     Omnicef [Cefdinir] Hives     SOB. Swelling of face     Penicillins Hives     Percocet [Oxycodone-Acetaminophen] Anxiety     Bad full body restlessness and anxiety when stopped 5mg/day       PMH:   Past Medical History:   Diagnosis Date     Depressive disorder    Anxiety    PSH:   Past Surgical History:   Procedure Laterality Date     ENT SURGERY     Frenulectomy.    SH:   Social History     Tobacco Use     Smoking status: Never Smoker     Smokeless tobacco: Never Used   Substance Use Topics     Alcohol use: Yes     Comment: occasionally   .    FH:   Family History   Problem Relation Age of Onset     Diabetes Mother         preDM tx with gastric bypass     Alcoholism Mother      Depression Mother      Heart Disease Father         open heart surgery     Alcoholism Father      Depression Father      Breast Cancer Maternal Grandmother      Cancer Paternal Grandmother      Hypertension No family hx of      Hyperlipidemia No family hx of      Cerebrovascular Disease No family hx of      PHYSICAL EXAMINATION: /71   Pulse 87   Temp 98.4  F (36.9  C) (Oral)   Ht 1.651 m (5' 5\")   Wt 67.2 kg (148 lb 1.6 oz)   SpO2 95%   BMI 24.65 kg/m     General: No acute distress.  Appears masculine.  Chest examination was performed in the presence of a chaperone.  On examination, patient has grade 1 ptosis on the right and grade 2 ptosis on the left.  Pectoralis muscle is intact bilaterally.  Notch to nipple distance is 19 cm on the right and 21 cm area where diameter is 3 cm bilaterally.  Nipple to fold distance is 3.5 cm bilaterally.  Base diameter is 14 cm bilaterally.  There is no palpable breast mass.  " There is no nipple discharge.    ASSESSMENT: Gender dysphoria, requesting top surgery.    PLAN: We received a letter of support from a mental health professional.  I am requesting a baseline screening mammogram given the family history of breast cancer.  With these obtained, patient is a potential candidate for bilateral simple complete mastectomy with free nipple graft reconstruction or bilateral reduction mammoplasty with inferior pedicle as a form of top surgery to masculinize the chest.  I explained both procedures in detail today.  They can be performed as an outpatient at the ambulatory surgery center.  I explained the differences that the inferior pedicle reduction mammoplasty technique leaves some tissue behind with a chance of viable nipples.  I explained the risks with surgery to include bleeding, infection, injury to surrounding structures, fluid collection, nipple or nipple graft loss, nipple sensory loss, change in nipple size, wound healing difficulties, contour deformity, dog ears, asymmetry, and need for revision surgery.  Patient accepts these risks and wishes to proceed with surgery.  We will wait for the mammogram results.    Total time spent with patient was 30 min of which greater than 50% was in counseling.    Again, thank you for allowing me to participate in the care of your patient.      Sincerely,    Jai Edward MD

## 2019-01-23 ENCOUNTER — ANCILLARY PROCEDURE (OUTPATIENT)
Dept: MAMMOGRAPHY | Facility: CLINIC | Age: 29
End: 2019-01-23
Payer: COMMERCIAL

## 2019-01-23 DIAGNOSIS — Z12.31 VISIT FOR SCREENING MAMMOGRAM: ICD-10-CM

## 2019-01-25 ENCOUNTER — PATIENT OUTREACH (OUTPATIENT)
Dept: PLASTIC SURGERY | Facility: CLINIC | Age: 29
End: 2019-01-25

## 2019-01-25 NOTE — PROGRESS NOTES
[facility]:  Care Coordination Note     SITUATION   Patient (Mo, He/Him) is a 29 year old who is receiving support for:  Clinic Care Coordination - Follow-up  .    BACKGROUND     Pt called reported he completed his mammogram.     ASSESSMENT     Surgery              [facility] Assessment  [facility] ([facility]) Enrollment: Enrolled  Patient has a therapist: Yes  Name of therapist: Cici Melton  Therapist's phone number: [phone]  Letter of support #1: Received  Letter #1 Date: 01/07/19  Surgery being considered: Yes  Mastectomy: Yes  Mammogram completed: Yes    Examination: Bilateral digital screening mammography with computer  aided detection     Comparison: None.     History: [age]-year-old transgender patient who has not yet had surgery  premammogram no family history of breast cancer     BREAST DENSITY: Heterogeneously dense.     COMMENTS: No mammographic evidence for malignancy is noted on today's  study there is a calcified lymph node in the left axilla                                                                    IMPRESSION: BI-RADS CATEGORY: 2 - Benign Finding(s).     RECOMMENDED FOLLOW-UP: Annual Mammography.      The patient will be notified of the results.      BRYAN ALMONTE MD      PLAN          Nursing Interventions:   Route to JD Jorgensen care coordinator to review mammogram.     Follow-up plan:  If mammogram is negative and approved; ready to PA, once approved schedule for surgery.        Erik Galindo

## 2019-02-05 ENCOUNTER — TELEPHONE (OUTPATIENT)
Dept: PLASTIC SURGERY | Facility: CLINIC | Age: 29
End: 2019-02-05

## 2019-02-08 ENCOUNTER — TELEPHONE (OUTPATIENT)
Dept: PLASTIC SURGERY | Facility: CLINIC | Age: 29
End: 2019-02-08

## 2019-02-08 NOTE — TELEPHONE ENCOUNTER
Received Jim prior authorization approval#[id number] with date span 2/15/19-5/15/19 for bilateral mastectomy

## 2019-02-11 DIAGNOSIS — F64.0 GENDER DYSPHORIA IN ADULT: Primary | ICD-10-CM

## 2019-02-11 RX ORDER — CLINDAMYCIN PHOSPHATE 900 MG/50ML
900 INJECTION, SOLUTION INTRAVENOUS
Status: CANCELLED | OUTPATIENT
Start: 2019-02-11

## 2019-02-11 RX ORDER — CLINDAMYCIN PHOSPHATE 900 MG/50ML
900 INJECTION, SOLUTION INTRAVENOUS SEE ADMIN INSTRUCTIONS
Status: CANCELLED | OUTPATIENT
Start: 2019-02-11

## 2019-02-18 ENCOUNTER — TELEPHONE (OUTPATIENT)
Dept: PLASTIC SURGERY | Facility: CLINIC | Age: 29
End: 2019-02-18

## 2019-02-18 ENCOUNTER — HOSPITAL ENCOUNTER (OUTPATIENT)
Facility: CLINIC | Age: 29
End: 2019-02-18
Attending: PLASTIC SURGERY | Admitting: PLASTIC SURGERY

## 2019-02-18 NOTE — TELEPHONE ENCOUNTER
Spoke with patient to schedule surgery with Dr Jai Edward    [facility]    Patient will have Pre-Op with SURGICAL RN on 3/19  -Patient advised H&P is needed or surgery cancellation may occur    Post-Op care appointment scheduled?  YES on 4/16    Patient is aware a / is needed day of surgery.     Surgery packet was sent via [employer], patient has my direct contact information for any further questions.     Christel Vazquez  [facility] [place]-Op Coordinator  [phone]

## 2019-02-21 ENCOUNTER — OFFICE VISIT (OUTPATIENT)
Dept: FAMILY MEDICINE | Facility: CLINIC | Age: 29
End: 2019-02-21
Payer: COMMERCIAL

## 2019-02-21 VITALS
TEMPERATURE: 98.4 F | OXYGEN SATURATION: 96 % | BODY MASS INDEX: 25.79 KG/M2 | DIASTOLIC BLOOD PRESSURE: 76 MMHG | WEIGHT: 155 LBS | HEART RATE: 90 BPM | SYSTOLIC BLOOD PRESSURE: 133 MMHG

## 2019-02-21 DIAGNOSIS — F33.1 MODERATE EPISODE OF RECURRENT MAJOR DEPRESSIVE DISORDER (H): ICD-10-CM

## 2019-02-21 DIAGNOSIS — F64.0 GENDER DYSPHORIA IN ADULT: Primary | ICD-10-CM

## 2019-02-21 DIAGNOSIS — Z51.81 ENCOUNTER FOR THERAPEUTIC DRUG MONITORING: ICD-10-CM

## 2019-02-21 LAB
ALBUMIN SERPL-MCNC: 4.9 MG/DL (ref 3.8–5)
ALP SERPL-CCNC: 75.7 U/L (ref 31.7–110.7)
ALT SERPL-CCNC: 15.4 U/L (ref 0–45)
AST SERPL-CCNC: 17 U/L (ref 0–55)
BILIRUB SERPL-MCNC: 0.5 MG/DL (ref 0.2–1.3)
BUN SERPL-MCNC: 11.9 MG/DL (ref 7–21)
CALCIUM SERPL-MCNC: 9.7 MG/DL (ref 8.5–10.1)
CHLORIDE SERPLBLD-SCNC: 101.1 MMOL/L (ref 98–110)
CHOLEST SERPL-MCNC: 212.9 MG/DL (ref 0–200)
CHOLEST/HDLC SERPL: 3.7 {RATIO} (ref 0–5)
CO2 SERPL-SCNC: 27.7 MMOL/L (ref 20–32)
CREAT SERPL-MCNC: 0.7 MG/DL (ref 0.7–1.3)
GFR SERPL CREATININE-BSD FRML MDRD: >90 ML/MIN/1.7 M2
GLUCOSE SERPL-MCNC: 86.8 MG'DL (ref 70–99)
HDLC SERPL-MCNC: 57.1 MG/DL
HEMOGLOBIN: 14.6 G/DL (ref 13.3–17.7)
LDLC SERPL CALC-MCNC: 143 MG/DL (ref 0–129)
POTASSIUM SERPL-SCNC: 4.2 MMOL/DL (ref 3.3–4.5)
PROT SERPL-MCNC: 7.7 G/DL (ref 6.8–8.8)
SODIUM SERPL-SCNC: 137.1 MMOL/L (ref 132.6–141.4)
TRIGL SERPL-MCNC: 61.7 MG/DL (ref 0–150)
VLDL CHOLESTEROL: 12.3 MG/DL (ref 7–32)

## 2019-02-21 RX ORDER — TESTOSTERONE CYPIONATE 200 MG/ML
40 INJECTION, SOLUTION INTRAMUSCULAR WEEKLY
Qty: 4 ML | Refills: 1 | Status: SHIPPED | OUTPATIENT
Start: 2019-02-21 | End: 2019-08-23

## 2019-02-21 RX ORDER — SERTRALINE HYDROCHLORIDE 100 MG/1
100 TABLET, FILM COATED ORAL DAILY
Qty: 30 TABLET | Refills: 11 | Status: SHIPPED | OUTPATIENT
Start: 2019-02-21 | End: 2019-10-24

## 2019-02-21 NOTE — PROGRESS NOTES
"        ERIC Hassan is a 29 year old individual that uses pronouns He/Him/His/Himself that presents today for follow up of:  masculinizing hormone therapy. Gender identity: male    Masculinizing therapy  5/14/18 Testosterone cypionate 40mg IM weekly started   8/15/18: FV=623, monitoring labs wnl  Shot day = Saturday  Changes going well  Mentally hard to catch up with body changes.     MDD and TODD  6/26/18: zoloft dose increased from 50-75mg  As depression is getting better, anxiety is getting worse.   Worsening anxiety. Anxious all the time, especially when thinking about upcoming top surgery in April 2019. Worried about pain management and reaction to pain meds due to history of bad anxiety triggered by opioids with ovary problem last fall.   Therapist twice weekly  Has SADD light and SADD alarm clock  Taking vitamin D 1 tablet daily (unsure of dose, either 1000 or 5000)    Ear problems  Intermittent difficulty hearing  Pain behind ears bilaterally  Previously ear lavage helped this.   Has tried using debrox drops which doesn't help  Uses qtips (knows not supposed to)  ---    Past Surgical History:   Procedure Laterality Date     ENT SURGERY         Patient Active Problem List   Diagnosis     TODD (generalized anxiety disorder)     Moderate episode of recurrent major depressive disorder (H)     Gender dysphoria in adult     Healthcare maintenance       Current Outpatient Medications   Medication Sig Dispense Refill     Acetaminophen (TYLENOL PO) Take 325 mg by mouth as needed for mild pain or fever       albuterol (PROAIR HFA/PROVENTIL HFA/VENTOLIN HFA) 108 (90 Base) MCG/ACT inhaler Inhale 2 puffs into the lungs every 6 hours as needed for shortness of breath / dyspnea or wheezing 1 Inhaler 3     needle, disp, 18G X 1\" MISC Use to draw up hormones once weekly 25 each 3     Needle, Disp, 25G X 1\" MISC Use to administer hormone IM once weekly 50 each 3     sertraline (ZOLOFT) 50 MG tablet Take 1.5 tablets (75 mg) by " "mouth daily 45 tablet 5     spacer (OPTICHAMBER VICENTE) holding chamber Holding/spacer device to use with inhaler. 1 each 0     syringe, disposable, 1 ML MISC Use once weekly to draw up hormones 25 each 3     testosterone cypionate (DEPOTESTOTERONE) 200 MG/ML injection Inject 0.2 mLs (40 mg) into the muscle once a week Needs 4 ml for 84 day supply 4 mL 0       History   Smoking Status     Never Smoker   Smokeless Tobacco     Never Used          Allergies   Allergen Reactions     Cats      Sneezing and watery eyes     Omnicef [Cefdinir] Hives     SOB. Swelling of face     Penicillins Hives     Percocet [Oxycodone-Acetaminophen] Anxiety     Bad full body restlessness and anxiety when stopped 5mg/day       Problem, Medication and Allergy Lists were reviewed and updated if needed..         Review of Systems:      General    Fat redistribution: muscles growing but fat still there, so feels like getting fatter.     Weight change: YES HEENT    Voice change: YES     Cardiovascular (CV)    Chest Pains: no    Shortness of breath: no    Not binding during winter Chest    Decreased exercise tolerance:  No. Capacity has increased     Gastrointestinal (GI)    Abdominal pain: no    Change in appetite: no Skin    Acne or oily skin: no    Change in hair: no     Genitourinary ()    Abnormal vaginal bleeding: no, occassional spotting    Change in libido: no    New sexual partners: no Musculoskeletal    Leg pain or swelling: YES - occassional cramps in shins and calves    Back pain - went to chiropractor, tight obliques/sides     Psychiatric (Psych)    Depression: SADD    Anxiety/Panic: worse    Mood:  \"pretty good\". Worse with snowy cold weather.                     Physical Exam:     Vitals:    02/21/19 1013   BP: 133/76   Pulse: 90   Temp: 98.4  F (36.9  C)   SpO2: 96%   Weight: 70.3 kg (155 lb)     BMI= Body mass index is 25.79 kg/m .   Wt Readings from Last 10 Encounters:   02/21/19 70.3 kg (155 lb)   01/15/19 67.2 kg (148 lb " 1.6 oz)   12/04/18 67.3 kg (148 lb 6.4 oz)   08/28/18 65.7 kg (144 lb 12.8 oz)   06/26/18 63.5 kg (140 lb)   05/14/18 60.5 kg (133 lb 6.4 oz)   04/17/18 61.1 kg (134 lb 12.8 oz)     Appearance: Male appearance and dress    GENERAL:: healthy, alert and no distress  RESP: lungs clear to auscultation - no rales, no rhonchi, no wheezes  CV: regular rates and rhythm, normal S1 S2, no S3 or S4 and no murmur, no click or rub -  ABDOMEN: soft, no tenderness, no  hepatosplenomegaly, no masses, normal bowel sounds  Psych: Alert and oriented times 3; coherent speech, normal rate and volume, able to articulate logical thoughts, able to abstract reason, no tangential thoughts, no hallucinations or delusions. Affect is bright.         Labs:     Results for orders placed or performed in visit on 02/21/19   Testosterone Total   Result Value Ref Range    Testosterone Total 397 240 - 950 ng/dL   Comprehensive Metabolic Panel   Result Value Ref Range    Albumin 4.9 3.8 - 5.0 mg/dL    Alkaline Phosphatase 75.7 31.7 - 110.7 U/L    ALT 15.4 0.0 - 45.0 U/L    AST 17.0 0.0 - 55.0 U/L    Bilirubin Total 0.5 0.2 - 1.3 mg/dL    Urea Nitrogen 11.9 7.0 - 21.0 mg/dL    Calcium 9.7 8.5 - 10.1 mg/dL    Chloride 101.1 98.0 - 110.0 mmol/L    Carbon Dioxide 27.7 20.0 - 32.0 mmol/L    Creatinine 0.7 0.7 - 1.3 mg/dL    Glucose 86.8 70.0 - 99.0 mg'dL    Potassium 4.2 3.3 - 4.5 mmol/dL    Sodium 137.1 132.6 - 141.4 mmol/L    Protein Total 7.7 6.8 - 8.8 g/dL    GFR Estimate >90 >60.0 mL/min/1.7 m2    GFR Estimate If Black >90 >60.0 mL/min/1.7 m2   Hemoglobin (HGB)   Result Value Ref Range    Hemoglobin 14.6 13.3 - 17.7 g/dL   Lipid Cascade   Result Value Ref Range    Cholesterol 212.9 (H) 0.0 - 200.0 mg/dL    Cholesterol/HDL Ratio 3.7 0.0 - 5.0    HDL Cholesterol 57.1 >40.0 mg/dL    LDL Cholesterol Calculated 143 (H) 0 - 129 mg/dL    Triglycerides 61.7 0.0 - 150.0 mg/dL    VLDL Cholesterol 12.3 7.0 - 32.0 mg/dL       Assessment and Plan     Patient  Instructions   Here is the plan from today's visit    1. Encounter for therapeutic drug monitoring  Labs today:  - Testosterone Total  - Comprehensive Metabolic Panel  - Hemoglobin (HGB)  - Lipid Cascade    2. Gender dysphoria in adult  No dose change for now, room to go up if patient desires and is stable from a mental health standpoint.   - testosterone cypionate (DEPOTESTOSTERONE) 200 MG/ML injection; Inject 0.2 mLs (40 mg) into the muscle once a week Needs 4 ml for 84 day supply  Dispense: 4 mL; Refill: 1    3. Moderate episode of recurrent major depressive disorder (H) with anxiety. Anxiety not well controlled.   Increase dose  - sertraline (ZOLOFT) 100 MG tablet; Take 1 tablet (100 mg) by mouth daily  Dispense: 30 tablet; Refill: 11    Follow up plan  Please make a clinic appointment for follow up with me (LACEY MEDEIROS) in 4 weeks for pre-op and mental health follow up    Results by joce  Questions were elicited and answered.     I spent 25 min face to face with the patient and >50% was spent counselling the patient about the above medical conditions, educating patient and discussing the recommendations and followup .    MD [facility]

## 2019-02-21 NOTE — PATIENT INSTRUCTIONS
Here is the plan from today's visit    1. Encounter for therapeutic drug monitoring  Labs today:  - Testosterone Total  - Comprehensive Metabolic Panel  - Hemoglobin (HGB)  - Lipid Cascade    2. Gender dysphoria in adult  No dose change for now  - testosterone cypionate (DEPOTESTOSTERONE) 200 MG/ML injection; Inject 0.2 mLs (40 mg) into the muscle once a week Needs 4 ml for 84 day supply  Dispense: 4 mL; Refill: 1    3. Moderate episode of recurrent major depressive disorder (H)  Increase dose  - sertraline (ZOLOFT) 100 MG tablet; Take 1 tablet (100 mg) by mouth daily  Dispense: 30 tablet; Refill: 11    Follow up plan  Please make a clinic appointment for follow up with me (LACEY MEDEIROS) in 4 weeks for pre-op and mental health follow up    Thank you for coming to [facility] today.  Lab Testing:  **If you had lab testing today and your results are reassuring or normal they will be mailed to you or sent through [facility] within 7 days.   **If the lab tests need quick action we will call you with the results.  The phone number we will call with results is # [phone] (home) . If this is not the best number please call our clinic and change the number.  Medication Refills:  If you need any refills please call your pharmacy and they will contact us.   If you need to  your refill at a new pharmacy, please contact the new pharmacy directly. The new pharmacy will help you get your medications transferred faster.   Scheduling:  If you have any concerns about today's visit or wish to schedule another appointment please call our office during normal business hours [phone] (8-5:00 M-F)  If a referral was made to a [facility] and you don't get a call from central scheduling please call [phone].  If a Mammogram was ordered for you at [facility] call [phone] to schedule or change your appointment.  If you had an XRay/CT/Ultrasound/MRI ordered the number is  [phone] to schedule or change your radiology appointment.   Medical Concerns:  If you have urgent medical concerns please call [phone] at any time of the day.    Ema Arechiga MD

## 2019-02-23 LAB — TESTOST SERPL-MCNC: 397 NG/DL (ref 240–950)

## 2019-03-01 NOTE — RESULT ENCOUNTER NOTE
Mo,   Your monitoring labs are all normal and reassuring.  If you have any further questions feel free to contact me via [employer] message.     Sincerely,   Ema Arechiga MD

## 2019-03-18 ENCOUNTER — DOCUMENTATION ONLY (OUTPATIENT)
Dept: PLASTIC SURGERY | Facility: CLINIC | Age: 29
End: 2019-03-18

## 2019-03-18 NOTE — PROGRESS NOTES
Writer filled out patient's FMLA paperwork and faxed to [facility] this day.  [facility]

## 2019-03-19 ENCOUNTER — OFFICE VISIT (OUTPATIENT)
Dept: PLASTIC SURGERY | Facility: CLINIC | Age: 29
End: 2019-03-19
Payer: COMMERCIAL

## 2019-03-19 DIAGNOSIS — F64.0 GENDER DYSPHORIA IN ADULT: Primary | ICD-10-CM

## 2019-03-19 ASSESSMENT — PAIN SCALES - GENERAL: PAINLEVEL: NO PAIN (0)

## 2019-03-19 NOTE — LETTER
RE: Mo Lopez  [address]  [facility] [address]     Dear Colleague,    Thank you for referring your patient, Mo Lopez, to the [facility] P[facility] RECONSTRU[facility] SURGERY at [facility]. Please see a copy of my visit note below.    Patient returns for a preoperative visit prior to undergoing mastectomy for gender dysphoria.    INTERVAL HISTORY: Patient has considered his surgical options.  He would like to undergo bilateral simple complete mastectomy with free nipple graft reconstruction.  He is scheduled to undergo preoperative H&P on Friday.    PHYSICAL EXAMINATION:  General: No acute distress.  Appears masculine.  Chest examination was deferred today.    IMAGING:   Examination: Bilateral digital screening mammography with computer  aided detection     Comparison: None.     History: [age]-year-old transgender patient who has not yet had surgery  premammogram no family history of breast cancer     BREAST DENSITY: Heterogeneously dense.     COMMENTS: No mammographic evidence for malignancy is noted on today's  study there is a calcified lymph node in the left axilla                                                                      IMPRESSION: BI-RADS CATEGORY: 2 - Benign Finding(s).    ASSESSMENT: Gender dysphoria, candidate for top surgery.    PLAN: Patient is a candidate for bilateral simple complete mastectomy with free nipple graft reconstruction.  I explained the procedure in detail today.  Again I explained the risks to include bleeding, infection, injury to surrounding structures, fluid collection, nipple or nipple graft loss, nipple sensory loss, change in nipple size, wound healing difficulties, contour deformity, dog ears, asymmetry, and need for revision surgery.  Patient accepts these associated risks and wishes to proceed with surgery.  Consent was obtained.    Total time spent with patient was 15 min of which greater than 50% was in  counseling.    Again, thank you for allowing me to participate in the care of your patient.      Sincerely,    Makeda Corona RN

## 2019-03-19 NOTE — PROGRESS NOTES
Patient returns for a preoperative visit prior to undergoing mastectomy for gender dysphoria.    INTERVAL HISTORY: Patient has considered his surgical options.  He would like to undergo bilateral simple complete mastectomy with free nipple graft reconstruction.  He is scheduled to undergo preoperative H&P on Friday.    PHYSICAL EXAMINATION:  General: No acute distress.  Appears masculine.  Chest examination was deferred today.    IMAGING:   Examination: Bilateral digital screening mammography with computer  aided detection     Comparison: None.     History: [age]-year-old transgender patient who has not yet had surgery  premammogram no family history of breast cancer     BREAST DENSITY: Heterogeneously dense.     COMMENTS: No mammographic evidence for malignancy is noted on today's  study there is a calcified lymph node in the left axilla                                                                      IMPRESSION: BI-RADS CATEGORY: 2 - Benign Finding(s).    ASSESSMENT: Gender dysphoria, candidate for top surgery.    PLAN: Patient is a candidate for bilateral simple complete mastectomy with free nipple graft reconstruction.  I explained the procedure in detail today.  Again I explained the risks to include bleeding, infection, injury to surrounding structures, fluid collection, nipple or nipple graft loss, nipple sensory loss, change in nipple size, wound healing difficulties, contour deformity, dog ears, asymmetry, and need for revision surgery.  Patient accepts these associated risks and wishes to proceed with surgery.  Consent was obtained.    Total time spent with patient was 15 min of which greater than 50% was in counseling.

## 2019-03-19 NOTE — NURSING NOTE
Chief Complaint   Patient presents with     Pre-Op Exam     Pt here for pre op for 3/29/19 surgery       There were no vitals filed for this visit.    There is no height or weight on file to calculate BMI.      MARE Davis NREMT                    No vitals taken per provider

## 2019-03-22 ENCOUNTER — OFFICE VISIT (OUTPATIENT)
Dept: FAMILY MEDICINE | Facility: CLINIC | Age: 29
End: 2019-03-22
Payer: COMMERCIAL

## 2019-03-22 VITALS
RESPIRATION RATE: 18 BRPM | HEART RATE: 92 BPM | WEIGHT: 152 LBS | SYSTOLIC BLOOD PRESSURE: 110 MMHG | OXYGEN SATURATION: 97 % | TEMPERATURE: 98.2 F | DIASTOLIC BLOOD PRESSURE: 75 MMHG | BODY MASS INDEX: 25.29 KG/M2

## 2019-03-22 DIAGNOSIS — F64.0 GENDER DYSPHORIA IN ADULT: ICD-10-CM

## 2019-03-22 DIAGNOSIS — Z01.818 PREOP GENERAL PHYSICAL EXAM: Primary | ICD-10-CM

## 2019-03-22 RX ORDER — CETIRIZINE HYDROCHLORIDE 10 MG/1
10 TABLET ORAL DAILY
COMMUNITY
Start: 2019-03-22

## 2019-03-22 ASSESSMENT — ANXIETY QUESTIONNAIRES
6. BECOMING EASILY ANNOYED OR IRRITABLE: MORE THAN HALF THE DAYS
3. WORRYING TOO MUCH ABOUT DIFFERENT THINGS: SEVERAL DAYS
5. BEING SO RESTLESS THAT IT IS HARD TO SIT STILL: SEVERAL DAYS
2. NOT BEING ABLE TO STOP OR CONTROL WORRYING: SEVERAL DAYS
7. FEELING AFRAID AS IF SOMETHING AWFUL MIGHT HAPPEN: NOT AT ALL
1. FEELING NERVOUS, ANXIOUS, OR ON EDGE: SEVERAL DAYS
IF YOU CHECKED OFF ANY PROBLEMS ON THIS QUESTIONNAIRE, HOW DIFFICULT HAVE THESE PROBLEMS MADE IT FOR YOU TO DO YOUR WORK, TAKE CARE OF THINGS AT HOME, OR GET ALONG WITH OTHER PEOPLE: SOMEWHAT DIFFICULT
GAD7 TOTAL SCORE: 7

## 2019-03-22 ASSESSMENT — PATIENT HEALTH QUESTIONNAIRE - PHQ9
SUM OF ALL RESPONSES TO PHQ QUESTIONS 1-9: 9
5. POOR APPETITE OR OVEREATING: SEVERAL DAYS

## 2019-03-22 NOTE — PROGRESS NOTES
[facility]  [address],  [address]  [facility] [address]  Phone: [phone]  Fax: [phone]    3/22/2019    Adult PRE-OP Evaluation:    Mo Lopez, 1990 presents for pre-operative evaluation and assessment as requested by Dr. Edward, prior to undergoing surgery/procedure for treatment of  Gender dysphoria .    Proposed procedure: Bilateral reduction mammaplasty with inferior pedicle versus mastectomy with free nipple grafting (Trans mastectomy)    Date of Surgery/ Procedure: 3/29/19  Hospital/Surgical Facility: [facility]  Phone [phone]  Fax [phone]     Primary Physician: Ema Arechiga  Type of Anesthesia Anticipated: General  History of anesthesia complications: NONE  History of  abnormal bleeding: NONE   History of blood transfusions: NO  Patient has a Health Care Directive or Living Will:  NO - ACD given    Preoperative Questions   1. NO - Do you have a history of heart attack, stroke, stent, bypass or surgery on an artery in the head, neck, heart or legs?  2. NO - Do you ever have any pain or discomfort in your chest?  3. NO - Have you ever had a severe pain across the front of your chest lasting for half an hour or more?  4. NO - Do you have a history of Congestive Heart Failure?  5. NO - Are you troubled by shortness of breath when: walking on the level/ up a slight hill/ at night?  6. YES - Does your chest ever sound wheezy or whistling? With asthma exacerbations only.  7. NO - Do you currently have a cold, bronchitis or other respiratory infection?  8. NO - Have you had a cold, bronchitis or other respiratory infection within the last 2 weeks?  9. NO - Do you usually have a cough?  10. NO - Do you sometimes get pains in the calves of your legs when you walk?  11. NO - Do you or anyone in your family have previous history of blood clots?  12. NO - Do you or does anyone in your family have a serious bleeding problem such as prolonged bleeding  "following surgeries or cuts?  13. NO - Have you ever had problems with anemia or been told to take iron pills?  14. NO - Have you had any abnormal blood loss such as black, tarry or bloody stools, or abnormal vaginal bleeding?  15. NO - Have you ever had a blood transfusion?  16. NO - Have you or any of your relatives ever had problems with anesthesia?  17. NO - Do you have sleep apnea, excessive snoring or daytime drowsiness?  18. NO - Do you have any prosthetic heart valves?  19. NO - Do you have prosthetic joints?  20. NO - Is there any chance that you may be pregnant?    Patient Active Problem List   Diagnosis     TODD (generalized anxiety disorder)     Moderate episode of recurrent major depressive disorder (H)     Gender dysphoria in adult     Healthcare maintenance     Past Surgical History:   Procedure Laterality Date     wisdom teeth  2010         Current Outpatient Medications on File Prior to Visit:  Acetaminophen (TYLENOL PO) Take 325 mg by mouth as needed for mild pain or fever   albuterol (PROAIR HFA/PROVENTIL HFA/VENTOLIN HFA) 108 (90 Base) MCG/ACT inhaler Inhale 2 puffs into the lungs every 6 hours as needed for shortness of breath / dyspnea or wheezing   cetirizine (ZYRTEC) 10 MG tablet Take 1 tablet (10 mg) by mouth daily   needle, disp, 18G X 1\" MISC Use to draw up hormones once weekly   sertraline (ZOLOFT) 100 MG tablet Take 1 tablet (100 mg) by mouth daily   spacer (OPTICHAMBER VICENTE) holding chamber Holding/spacer device to use with inhaler.   testosterone cypionate (DEPOTESTOSTERONE) 200 MG/ML injection Inject 0.2 mLs (40 mg) into the muscle once a week Needs 4 ml for 84 day supply     No current facility-administered medications on file prior to visit.     OTC products: None, except as noted above    Allergies   Allergen Reactions     Cats      Sneezing and watery eyes     Omnicef [Cefdinir] Hives     SOB. Swelling of face     Penicillins Hives     Percocet [Oxycodone-Acetaminophen] Anxiety "     Bad full body restlessness and anxiety when stopped 5mg/day     Latex Allergy: NO    Social History     Socioeconomic History     Marital status: Single     Spouse name: Not on file     Number of children: Not on file     Years of education: Not on file     Highest education level: Not on file   Occupational History     Not on file   Social Needs     Financial resource strain: Not on file     Food insecurity:     Worry: Not on file     Inability: Not on file     Transportation needs:     Medical: Not on file     Non-medical: Not on file   Tobacco Use     Smoking status: Never Smoker     Smokeless tobacco: Never Used   Substance and Sexual Activity     Alcohol use: Yes     Comment: occasionally     Drug use: Yes     Types: Marijuana     Sexual activity: Yes     Partners: Female     Birth control/protection: None   Lifestyle     Physical activity:     Days per week: Not on file     Minutes per session: Not on file     Stress: Not on file   Relationships     Social connections:     Talks on phone: Not on file     Gets together: Not on file     Attends Buddhism service: Not on file     Active member of club or organization: Not on file     Attends meetings of clubs or organizations: Not on file     Relationship status: Not on file     Intimate partner violence:     Fear of current or ex partner: Not on file     Emotionally abused: Not on file     Physically abused: Not on file     Forced sexual activity: Not on file   Other Topics Concern     Not on file   Social History Narrative     Not on file       REVIEW OF SYSTEMS:   CONSTITUTIONAL: NEGATIVE for fever, chills, unexpected change in weight  INTEGUMENTARY/SKIN: NEGATIVE for worrisome rashes, moles or lesions  EYES: NEGATIVE for vision changes or irritation  ENT/MOUTH: NEGATIVE for ear, mouth and throat problems  RESP: NEGATIVE for significant cough or SOB  BREAST: NEGATIVE for masses, tenderness or discharge  CV: NEGATIVE for chest pain, palpitations or  peripheral edema  GI: NEGATIVE for nausea, abdominal pain, heartburn, or change in bowel habits  : NEGATIVE for frequency, dysuria, or hematuria  MUSCULOSKELETAL: NEGATIVE for significant arthralgias or myalgia  NEURO: NEGATIVE for weakness, dizziness or paresthesias  ENDOCRINE: NEGATIVE for unexplained skin/hair changes  HEME: NEGATIVE for bleeding problems  PSYCHIATRIC: NEGATIVE for changes in mood or affect    EXAM:     Patient Vitals for the past 24 hrs:   BP Temp Temp src Pulse Resp SpO2 Weight   03/22/19 1319 110/75 98.2  F (36.8  C) Oral 92 18 97 % 68.9 kg (152 lb)     Body mass index is 25.29 kg/m .  GENERAL: healthy, alert and no distress  EYES: Eyes grossly normal to inspection, extraocular movements - intact, and PERRL  HENT: ear canals- normal; TMs- normal; Nose- normal; Mouth- no ulcers, no lesions  NECK: no tenderness, no adenopathy, no asymmetry, no masses, no stiffness; thyroid- normal to palpation  RESP: lungs clear to auscultation - no rales, no rhonchi, no wheezes  CV: regular rates and rhythm, normal S1 S2, no S3 or S4 and no murmur, no click or rub -  ABDOMEN: soft, no tenderness, no  hepatosplenomegaly, no masses, normal bowel sounds  MS: extremities- no gross deformities noted, no edema  SKIN: no suspicious lesions, no rashes  NEURO: strength and tone- normal, sensory exam- grossly normal, mentation- intact, speech- normal, reflexes- symmetric  BACK: no CVA tenderness, no paralumbar tenderness  PSYCH: Alert and oriented times 3; speech- coherent , normal rate and volume; able to articulate logical thoughts  LYMPHATICS: ant. cervical- normal, post. cervical- normal    DIAGNOSTICS:      No labs or EKG required for low risk surgery    RISK ASSESSMENT:     Cardiovascular Risk:  -Patient is able to participate in strenuous activities without chest pain.  -The patient does not have chest pain at rest or with exertion.  -Patient does not have a history of congestive heart failure.    -The patient  does not have a history of stroke and does not have a history of valvular disease.    Pulmonary Risk:  -In terms of risk factors for pulmonary complication, the patient has no risk factors    Perioperative Complications:  -The patient does not have a history of bleeding or clotting problems in the past.    -The patient has not had complications from surgeries.    -The patient does not have a family history of any anesthesia or surgical complications.      IMPRESSION:   Reason for surgery/procedure: gender dysphoria    The proposed surgical procedure is considered LOW risk.    For above listed surgery and anesthesia:   Patient is at low risk for surgery/procedure and perioperative/procedure complications.    RECOMMENDATIONS:     Labs:  None indicated    Fasting:  Per surgeon    Preop Plan:  --Approval given to proceed with proposed procedure, without further diagnostic evaluation    Medications:  Patient should take their regular medications the morning of surgery unless otherwise instructed.    Hold ibuprofen for 5 days prior.      Ema Arechiga MD    Please contact our office if there are any further questions or information required about this patient.

## 2019-03-23 ASSESSMENT — ANXIETY QUESTIONNAIRES: GAD7 TOTAL SCORE: 7

## 2019-03-28 ENCOUNTER — ANESTHESIA EVENT (OUTPATIENT)
Dept: SURGERY | Facility: AMBULATORY SURGERY CENTER | Age: 29
End: 2019-03-28

## 2019-03-28 NOTE — ANESTHESIA PREPROCEDURE EVALUATION
Anesthesia Pre-Procedure Evaluation    Patient: Mo Lopez   MRN:     [id number] Gender:   adult   Age:    29 year old :      1990        Preoperative Diagnosis: Gender Dysphoria   Procedure(s):  Bilateral Reduction Mammaplasty with Inferior Pedicle Versus Mastectomy with Free Nipple Grafting     Past Medical History:   Diagnosis Date     Depressive disorder       Past Surgical History:   Procedure Laterality Date     wisdom teeth            Anesthesia Evaluation     . Pt has not had prior anesthetic            ROS/MED HX    ENT/Pulmonary:     (+)Intermittent asthma Treatment: Inhaler prn,  , . .    Neurologic:  - neg neurologic ROS     Cardiovascular:  - neg cardiovascular ROS       METS/Exercise Tolerance:     Hematologic:  - neg hematologic  ROS       Musculoskeletal:  - neg musculoskeletal ROS       GI/Hepatic:  - neg GI/hepatic ROS       Renal/Genitourinary:  - ROS Renal section negative       Endo:  - neg endo ROS       Psychiatric:     (+) psychiatric history depression, anxiety and other (comment) (gender dysphoria)      Infectious Disease:  - neg infectious disease ROS       Malignancy:      - no malignancy   Other:    - neg other ROS                     PHYSICAL EXAM:   Mental Status/Neuro: A/A/O   Airway:   Mallampati: I  Mouth/Opening: Full  TM distance: > 6 cm  Neck ROM: Full   Respiratory: Auscultation: CTAB     Resp. Rate: Normal     Resp. Effort: Normal      CV: Rhythm: Regular  Rate: Age appropriate  Heart: Normal Sounds   Comments:      Dental: Normal                  Lab Results   Component Value Date    HGB 14.6 2019    HCT 45.8 08/15/2018    .1 2019    POTASSIUM 4.2 2019    CHLORIDE 101.1 2019    CO2 27.7 2019    BUN 11.9 2019    CR 0.7 2019    GLC 86.8 2019    GILBERT 9.7 2019    PROTTOTAL 7.7 2019    ALT 15.4 2019    AST 17.0 2019    ALKPHOS 75.7 2019    BILITOTAL 0.5 2019       Preop  "Vitals  BP Readings from Last 3 Encounters:   03/22/19 110/75   02/21/19 133/76   01/15/19 128/71    Pulse Readings from Last 3 Encounters:   03/22/19 92   02/21/19 90   01/15/19 87      Resp Readings from Last 3 Encounters:   03/22/19 18   12/04/18 20   06/26/18 18    SpO2 Readings from Last 3 Encounters:   03/22/19 97%   02/21/19 96%   01/15/19 95%      Temp Readings from Last 1 Encounters:   03/22/19 36.8  C (98.2  F) (Oral)    Ht Readings from Last 1 Encounters:   01/15/19 1.651 m (5' 5\")      Wt Readings from Last 1 Encounters:   03/22/19 68.9 kg (152 lb)    Estimated body mass index is 25.29 kg/m  as calculated from the following:    Height as of 1/15/19: 1.651 m (5' 5\").    Weight as of 3/22/19: 68.9 kg (152 lb).     LDA:            Assessment:   ASA SCORE: 2    NPO Status: > 6 hours since completed Solid Foods   Documentation: H&P complete; Preop Testing complete; Consents complete   Proceeding: Proceed without further delay  Tobacco Use:  NO Active use of Tobacco/UNKNOWN Tobacco use status     Plan:   Anes. Type:  General; Regional     RA Details:  Pre Induction; SS; Exparel; FOR POSTOP PAIN CONTROL; L; R     RA-Location/Type: Plane Block (bilateral pec)   Pre-Induction: Midazolam IV; Opioid IV; Acetaminophen PO   Induction:  IV (Standard)   Airway: LMA   Access/Monitoring: PIV   Maintenance: Balanced   Emergence: Procedure Site   Logistics: Same Day Surgery     Postop Pain/Sedation Strategy:  Standard-Options: Opioids PRN; Block SS; Exparel     PONV Management:  Adult Risk Factors:, Non-Smoker, Postop Opioids  Prevention: Ondansetron     CONSENT: Direct conversation   Plan and risks discussed with: Patient   Blood Products: Consent Deferred (Minimal Blood Loss)       Comments for Plan/Consent:  30 yo for Bilateral Reduction Mammaplasty with Inferior Pedicle Versus Mastectomy with Free Nipple Grafting (Bilateral Breast) under GA/RA                         Vance Edward MD       Agree with anesthetic plan " as outlined above    Alberto Frederick MD  Staff Anesthesiologist  [phone]

## 2019-03-29 ENCOUNTER — ANESTHESIA (OUTPATIENT)
Dept: SURGERY | Facility: AMBULATORY SURGERY CENTER | Age: 29
End: 2019-03-29

## 2019-03-29 ENCOUNTER — HOSPITAL ENCOUNTER (OUTPATIENT)
Facility: AMBULATORY SURGERY CENTER | Age: 29
End: 2019-03-29
Attending: PLASTIC SURGERY
Payer: COMMERCIAL

## 2019-03-29 VITALS
OXYGEN SATURATION: 100 % | WEIGHT: 150 LBS | TEMPERATURE: 99.5 F | HEIGHT: 65 IN | RESPIRATION RATE: 16 BRPM | HEART RATE: 72 BPM | DIASTOLIC BLOOD PRESSURE: 75 MMHG | BODY MASS INDEX: 24.99 KG/M2 | SYSTOLIC BLOOD PRESSURE: 117 MMHG

## 2019-03-29 DIAGNOSIS — F64.0 GENDER DYSPHORIA IN ADULT: Primary | ICD-10-CM

## 2019-03-29 LAB
HCG UR QL: NEGATIVE
INTERNAL QC OK POCT: YES

## 2019-03-29 RX ORDER — BUPIVACAINE HYDROCHLORIDE 2.5 MG/ML
INJECTION, SOLUTION EPIDURAL; INFILTRATION; INTRACAUDAL PRN
Status: DISCONTINUED | OUTPATIENT
Start: 2019-03-29 | End: 2019-03-29

## 2019-03-29 RX ORDER — OXYCODONE HYDROCHLORIDE 5 MG/1
5 TABLET ORAL EVERY 4 HOURS PRN
Status: DISCONTINUED | OUTPATIENT
Start: 2019-03-29 | End: 2019-03-30 | Stop reason: HOSPADM

## 2019-03-29 RX ORDER — SODIUM CHLORIDE, SODIUM LACTATE, POTASSIUM CHLORIDE, CALCIUM CHLORIDE 600; 310; 30; 20 MG/100ML; MG/100ML; MG/100ML; MG/100ML
INJECTION, SOLUTION INTRAVENOUS CONTINUOUS
Status: DISCONTINUED | OUTPATIENT
Start: 2019-03-29 | End: 2019-03-30 | Stop reason: HOSPADM

## 2019-03-29 RX ORDER — SODIUM CHLORIDE, SODIUM LACTATE, POTASSIUM CHLORIDE, CALCIUM CHLORIDE 600; 310; 30; 20 MG/100ML; MG/100ML; MG/100ML; MG/100ML
INJECTION, SOLUTION INTRAVENOUS CONTINUOUS
Status: DISCONTINUED | OUTPATIENT
Start: 2019-03-29 | End: 2019-03-29 | Stop reason: HOSPADM

## 2019-03-29 RX ORDER — CLINDAMYCIN PHOSPHATE 900 MG/50ML
900 INJECTION, SOLUTION INTRAVENOUS
Status: DISCONTINUED | OUTPATIENT
Start: 2019-03-29 | End: 2019-03-29 | Stop reason: HOSPADM

## 2019-03-29 RX ORDER — NALOXONE HYDROCHLORIDE 0.4 MG/ML
.1-.4 INJECTION, SOLUTION INTRAMUSCULAR; INTRAVENOUS; SUBCUTANEOUS
Status: DISCONTINUED | OUTPATIENT
Start: 2019-03-29 | End: 2019-03-29 | Stop reason: HOSPADM

## 2019-03-29 RX ORDER — FENTANYL CITRATE 50 UG/ML
25-50 INJECTION, SOLUTION INTRAMUSCULAR; INTRAVENOUS
Status: DISCONTINUED | OUTPATIENT
Start: 2019-03-29 | End: 2019-03-29 | Stop reason: HOSPADM

## 2019-03-29 RX ORDER — MINERAL OIL
OIL (ML) MISCELLANEOUS PRN
Status: DISCONTINUED | OUTPATIENT
Start: 2019-03-29 | End: 2019-03-29 | Stop reason: HOSPADM

## 2019-03-29 RX ORDER — ONDANSETRON 2 MG/ML
INJECTION INTRAMUSCULAR; INTRAVENOUS PRN
Status: DISCONTINUED | OUTPATIENT
Start: 2019-03-29 | End: 2019-03-29

## 2019-03-29 RX ORDER — DEXAMETHASONE SODIUM PHOSPHATE 4 MG/ML
INJECTION, SOLUTION INTRA-ARTICULAR; INTRALESIONAL; INTRAMUSCULAR; INTRAVENOUS; SOFT TISSUE PRN
Status: DISCONTINUED | OUTPATIENT
Start: 2019-03-29 | End: 2019-03-29

## 2019-03-29 RX ORDER — MEPERIDINE HYDROCHLORIDE 25 MG/ML
12.5 INJECTION INTRAMUSCULAR; INTRAVENOUS; SUBCUTANEOUS
Status: DISCONTINUED | OUTPATIENT
Start: 2019-03-29 | End: 2019-03-30 | Stop reason: HOSPADM

## 2019-03-29 RX ORDER — OXYCODONE HYDROCHLORIDE 5 MG/1
5 TABLET ORAL
Status: COMPLETED | OUTPATIENT
Start: 2019-03-29 | End: 2019-03-29

## 2019-03-29 RX ORDER — ACETAMINOPHEN 325 MG/1
975 TABLET ORAL ONCE
Status: COMPLETED | OUTPATIENT
Start: 2019-03-29 | End: 2019-03-29

## 2019-03-29 RX ORDER — CLINDAMYCIN PHOSPHATE 900 MG/50ML
900 INJECTION, SOLUTION INTRAVENOUS SEE ADMIN INSTRUCTIONS
Status: DISCONTINUED | OUTPATIENT
Start: 2019-03-29 | End: 2019-03-29 | Stop reason: HOSPADM

## 2019-03-29 RX ORDER — EPHEDRINE SULFATE 50 MG/ML
INJECTION, SOLUTION INTRAMUSCULAR; INTRAVENOUS; SUBCUTANEOUS PRN
Status: DISCONTINUED | OUTPATIENT
Start: 2019-03-29 | End: 2019-03-29

## 2019-03-29 RX ORDER — NALOXONE HYDROCHLORIDE 0.4 MG/ML
.1-.4 INJECTION, SOLUTION INTRAMUSCULAR; INTRAVENOUS; SUBCUTANEOUS
Status: DISCONTINUED | OUTPATIENT
Start: 2019-03-29 | End: 2019-03-30 | Stop reason: HOSPADM

## 2019-03-29 RX ORDER — PROPOFOL 10 MG/ML
INJECTION, EMULSION INTRAVENOUS CONTINUOUS PRN
Status: DISCONTINUED | OUTPATIENT
Start: 2019-03-29 | End: 2019-03-29

## 2019-03-29 RX ORDER — PROPOFOL 10 MG/ML
INJECTION, EMULSION INTRAVENOUS PRN
Status: DISCONTINUED | OUTPATIENT
Start: 2019-03-29 | End: 2019-03-29

## 2019-03-29 RX ORDER — LIDOCAINE HYDROCHLORIDE 20 MG/ML
INJECTION, SOLUTION INFILTRATION; PERINEURAL PRN
Status: DISCONTINUED | OUTPATIENT
Start: 2019-03-29 | End: 2019-03-29

## 2019-03-29 RX ORDER — HYDROXYZINE HYDROCHLORIDE 25 MG/1
25 TABLET, FILM COATED ORAL
Status: DISCONTINUED | OUTPATIENT
Start: 2019-03-29 | End: 2019-03-30 | Stop reason: HOSPADM

## 2019-03-29 RX ORDER — ONDANSETRON 2 MG/ML
4 INJECTION INTRAMUSCULAR; INTRAVENOUS EVERY 30 MIN PRN
Status: DISCONTINUED | OUTPATIENT
Start: 2019-03-29 | End: 2019-03-30 | Stop reason: HOSPADM

## 2019-03-29 RX ORDER — ACETAMINOPHEN 325 MG/1
650 TABLET ORAL
Status: DISCONTINUED | OUTPATIENT
Start: 2019-03-29 | End: 2019-03-30 | Stop reason: HOSPADM

## 2019-03-29 RX ORDER — GABAPENTIN 300 MG/1
300 CAPSULE ORAL ONCE
Status: COMPLETED | OUTPATIENT
Start: 2019-03-29 | End: 2019-03-29

## 2019-03-29 RX ORDER — OXYCODONE HYDROCHLORIDE 5 MG/1
5-10 TABLET ORAL EVERY 6 HOURS PRN
Qty: 26 TABLET | Refills: 0 | Status: SHIPPED | OUTPATIENT
Start: 2019-03-29 | End: 2019-04-02

## 2019-03-29 RX ORDER — MAGNESIUM HYDROXIDE 1200 MG/15ML
LIQUID ORAL PRN
Status: DISCONTINUED | OUTPATIENT
Start: 2019-03-29 | End: 2019-03-29 | Stop reason: HOSPADM

## 2019-03-29 RX ORDER — ONDANSETRON 4 MG/1
4 TABLET, ORALLY DISINTEGRATING ORAL
Status: COMPLETED | OUTPATIENT
Start: 2019-03-29 | End: 2019-03-29

## 2019-03-29 RX ORDER — FLUMAZENIL 0.1 MG/ML
0.2 INJECTION, SOLUTION INTRAVENOUS
Status: DISCONTINUED | OUTPATIENT
Start: 2019-03-29 | End: 2019-03-29 | Stop reason: HOSPADM

## 2019-03-29 RX ORDER — ONDANSETRON 4 MG/1
4 TABLET, ORALLY DISINTEGRATING ORAL EVERY 30 MIN PRN
Status: DISCONTINUED | OUTPATIENT
Start: 2019-03-29 | End: 2019-03-30 | Stop reason: HOSPADM

## 2019-03-29 RX ADMIN — GABAPENTIN 300 MG: 300 CAPSULE ORAL at 10:37

## 2019-03-29 RX ADMIN — CLINDAMYCIN PHOSPHATE 900 MG: 900 INJECTION, SOLUTION INTRAVENOUS at 13:40

## 2019-03-29 RX ADMIN — OXYCODONE HYDROCHLORIDE 5 MG: 5 TABLET ORAL at 17:31

## 2019-03-29 RX ADMIN — PROPOFOL 50 MG: 10 INJECTION, EMULSION INTRAVENOUS at 14:06

## 2019-03-29 RX ADMIN — LIDOCAINE HYDROCHLORIDE 60 MG: 20 INJECTION, SOLUTION INFILTRATION; PERINEURAL at 13:35

## 2019-03-29 RX ADMIN — SODIUM CHLORIDE, SODIUM LACTATE, POTASSIUM CHLORIDE, CALCIUM CHLORIDE: 600; 310; 30; 20 INJECTION, SOLUTION INTRAVENOUS at 10:37

## 2019-03-29 RX ADMIN — EPHEDRINE SULFATE 5 MG: 50 INJECTION, SOLUTION INTRAMUSCULAR; INTRAVENOUS; SUBCUTANEOUS at 14:35

## 2019-03-29 RX ADMIN — FENTANYL CITRATE 50 MCG: 50 INJECTION, SOLUTION INTRAMUSCULAR; INTRAVENOUS at 14:06

## 2019-03-29 RX ADMIN — BUPIVACAINE HYDROCHLORIDE 20 ML: 2.5 INJECTION, SOLUTION EPIDURAL; INFILTRATION; INTRACAUDAL at 11:45

## 2019-03-29 RX ADMIN — ONDANSETRON 4 MG: 4 TABLET, ORALLY DISINTEGRATING ORAL at 17:12

## 2019-03-29 RX ADMIN — Medication 0.5 MG: at 14:08

## 2019-03-29 RX ADMIN — ONDANSETRON 4 MG: 2 INJECTION INTRAMUSCULAR; INTRAVENOUS at 13:50

## 2019-03-29 RX ADMIN — PROPOFOL 60 MG: 10 INJECTION, EMULSION INTRAVENOUS at 13:36

## 2019-03-29 RX ADMIN — DEXAMETHASONE SODIUM PHOSPHATE 4 MG: 4 INJECTION, SOLUTION INTRA-ARTICULAR; INTRALESIONAL; INTRAMUSCULAR; INTRAVENOUS; SOFT TISSUE at 13:50

## 2019-03-29 RX ADMIN — FENTANYL CITRATE 50 MCG: 50 INJECTION, SOLUTION INTRAMUSCULAR; INTRAVENOUS at 11:41

## 2019-03-29 RX ADMIN — PROPOFOL 200 MG: 10 INJECTION, EMULSION INTRAVENOUS at 13:35

## 2019-03-29 RX ADMIN — PROPOFOL 200 MCG/KG/MIN: 10 INJECTION, EMULSION INTRAVENOUS at 13:36

## 2019-03-29 RX ADMIN — FENTANYL CITRATE 50 MCG: 50 INJECTION, SOLUTION INTRAMUSCULAR; INTRAVENOUS at 13:35

## 2019-03-29 RX ADMIN — ACETAMINOPHEN 975 MG: 325 TABLET ORAL at 10:37

## 2019-03-29 ASSESSMENT — MIFFLIN-ST. JEOR: SCORE: 1582.28

## 2019-03-29 NOTE — DISCHARGE INSTRUCTIONS
"[facility]  Home Care Following Anesthesia  For 24 hours after surgery:  1. Get plenty of rest.  A responsible adult must stay with you for at least 24 hours after you leave the surgery center.  2. Do not drive or use heavy equipment.  If you have weakness or tingling, don't drive or use heavy equipment until this feeling goes away.   3. Do not drink alcohol.   4. Avoid strenuous or risky activities.  Ask for help when climbing stairs.  5. You may feel lightheaded.  IF so, sit for a few minutes before standing.  Have someone help you get up.   6. If you have nausea (feel sick to your stomach): Drink only clear liquids such as apple juice, ginger ale, broth or 7-Up.  Rest may also help.  Be sure to drink enough fluids.  Move to a regular diet as you feel able.   7. You may have a slight fever.  Call the doctor if your fever is over 100 F (37.7 C) (taken under the tongue) or lasts longer than 24 hours.  8. You may have a dry mouth, a sore throat, muscle aches or trouble sleeping. These should go away after 24 hours.  9. Do not make important or legal decisions.        Today you received an Exparel block to numb the nerves near your surgery site.  This is a block using local anesthetic or \"numbing\" medication injected around the nerves to anesthetize or \"numb\" the area supplied by those nerves.  This block is injected into the muscle layer near your surgical site.  This medication may numb the location where you had surgery up to 72 hours.  If your surgical site is an arm or leg you should be careful with your affected limb, since it is possible to injure your limb without being aware of it due to the numbing.  Until full feeling returns, you should guard against bumping or hitting your limb, and avoid extreme hot or cold temperatures on the skin.  As the block wears off, the feeling will return as a tingling or prickly sensation near your surgical site.  You will experince more " discomfort from your incision as the feeling returns.  You may want to take a pain pill (a narcotic or Tylenol if this was prescribed by your surgeon) when you start to experience mild pain before the pain beomes more severe.  If your pain medications do not control your pain, you should notify your surgeon.      Tips for taking pain medications  To get the best pain relief possible, remember these points:    Take pain medications as directed, before pain becomes severe.    Pain medication can upset your stomach: taking it with food may help.    Constipation is a common side effect of pain medication. Drink plenty of  fluids.    Eat foods high in fiber. Take a stool softener if recommended by your doctor or pharmacist.    Do not drink alcohol, drive or operate machinery while taking pain medications.    Ask about other ways to control pain, such as with heat, ice or relaxation.    Tylenol/Acetaminophen Consumption  To help encourage the safe use of acetaminophen, the makers of TYLENOL  have lowered the maximum daily dose for single-ingredient Extra Strength TYLENOL  (acetaminophen) products sold in the [place] from 8 pills per day (4,000 mg) to 6 pills per day (3,000 mg). The dosing interval has also changed from 2 pills every 4-6 hours to 2 pills every 6 hours.    If you feel your pain relief is insufficient, you may take Tylenol/Acetaminophen in addition to your narcotic pain medication.     Be careful not to exceed 3,000 mg of Tylenol/Acetaminophen in a 24 hour period from all sources.    If you are taking extra strength Tylenol/acetaminophen (500 mg), the maximum dose is 6 tablets in 24 hours.    If you are taking regular strength acetaminophen (325 mg), the maximum dose is 9 tablets in 24 hours.    Tylenol 975mg was given at 10:35am.  Next dose ok to take at 4:35pm.  Follow bottle instructions.    Call a doctor for any of the followin. Signs of infection (fever, growing tenderness at the surgery site, a large  "amount of drainage or bleeding, severe pain, foul-smelling drainage, redness, swelling).  2. It has been over 8 to 10 hours since surgery and you are still not able to urinate (pass water).  3. Headache for over 24 hours.  4. Numbness, tingling or weakness the day after surgery (if you had spinal anesthesia).  Your doctor is:  Dr. Jai Edward, Plastic Surgery: [phone]                  Or dial [phone] and ask for the resident on call for:  Plastics  For emergency care, call the:  [facility]:  [phone] (TTY for hearing impaired: [phone])        Information about liposomal bupivacaine (Exparel)    What is Liposomal Bupivacaine?    Liposomal Bupivacaine is a numbing medication that can help you manage your pain.  This medication is similar to \"novacaine,\" which is often used by the dentist.  Liposomal bupivacaine is released slowly and can help control pain for up to 72 hours or more.      What can I expect?    You may experience numbness, tingling, or a feeling of heaviness around the area that was injected.    If you experience any of the follow symptoms IMMEDIATELY CALL THE REGIONAL ANESTHESIA PAIN SERVICE:    Numbness or tingling occurs in areas other than around the injection site    Blurry vision    Ringing in your ears    A metallic taste in your mouth    PAGE: Dial [phone].  When prompted, enter the following 4-digit ID number:  [id number].  You will be prompted to enter your phone number; and then enter the # sign.  The clinician on call will call you back.    OR    CALL: Dial [phone].  Let the hospital  know that you are having a problem with a nerve block and that you would like to speak to the regional anesthesia pain service right away.    You should not receive any other type of numbing medication within 4 days after receiving liposomal bupivacaine unless your pain physician approves.  "

## 2019-03-29 NOTE — BRIEF OP NOTE
[facility]    Brief Operative Note    Pre-operative diagnosis: Gender Dysphoria  Post-operative diagnosis Same    Procedure: Procedure(s):  Bilateral Simple Complete  Mammoplasty with Free Nipple Grafting  Surgeon: Surgeon(s) and Role:     * Jai Edward MD - Primary  Anesthesia: Combined General with Block   Estimated blood loss: 50 ml  Drains: Bakari-Diaz x 2  Specimens:   ID Type Source Tests Collected by Time Destination   A : Right Breast. 176 grams. Tissue Breast, Right SURGICAL PATHOLOGY EXAM Jai Edward MD 3/29/2019  3:04 PM    B : Left Breast. 199 grams. Tissue Breast, Left SURGICAL PATHOLOGY EXAM Jai Edward MD 3/29/2019  3:07 PM      Findings:   Please see dictation.  Complications: None.  Implants:  None.

## 2019-03-29 NOTE — OR NURSING
Patient received bilateral Pectoralis nerve block  with Exparel.  Fentanyl 50mcg and Versed 1mg given. Tolerated procedure well.

## 2019-03-29 NOTE — ANESTHESIA CARE TRANSFER NOTE
Patient: Mo Lopez    Procedure(s):  Bilateral Simple Complete  Mammoplasty with Free Nipple Grafting    Diagnosis: Gender Dysphoria  Diagnosis Additional Information: No value filed.    Anesthesia Type:   No value filed.     Note:  Airway :Room Air  Patient transferred to:PACU  Comments: Report to RN    132/91, 16, 91, 97%Handoff Report: Identifed the Patient, Identified the Reponsible Provider, Reviewed the pertinent medical history, Discussed the surgical course, Reviewed Intra-OP anesthesia mangement and issues during anesthesia, Set expectations for post-procedure period and Allowed opportunity for questions and acknowledgement of understanding      Vitals: (Last set prior to Anesthesia Care Transfer)    CRNA VITALS  3/29/2019 1604 - 3/29/2019 1639      3/29/2019             Pulse:  103    SpO2:  100 %    Resp Rate (observed):  4  (Abnormal)                 Electronically Signed By: ASHER Gold CRNA  March 29, 2019  4:39 PM

## 2019-03-29 NOTE — ANESTHESIA POSTPROCEDURE EVALUATION
Anesthesia POST Procedure Evaluation    Patient: Mo Lopez   MRN:     [id number] Gender:   adult   Age:    29 year old :      1990        Preoperative Diagnosis: Gender Dysphoria   Procedure(s):  Bilateral Simple Complete  Mammoplasty with Free Nipple Grafting   Postop Comments: No value filed.       Anesthesia Type:  General, Regional    Reportable Event: NO     PAIN: Uncomplicated   Sign Out status: Comfortable, Well controlled pain     PONV: PONV Occurence     Symptoms:  Nausea only (mild)     Interventions: 5-HT3 antagonist; Dexamethasone   Sign Out status:  No Nausea or Vomiting     Neuro/Psych: Uneventful perioperative course   Sign Out Status: Preoperative baseline; Age appropriate mentation     Airway/Resp.: Uneventful perioperative course   Sign Out Status: Non labored breathing, age appropriate RR; Resp. Status within EXPECTED Parameters     CV: Uneventful perioperative course   Sign Out status: Appropriate BP and perfusion indices; Appropriate HR/Rhythm     Disposition:   Sign Out in:  PACU  Disposition:  Phase II; Home  Recovery Course: Uneventful  Follow-Up: Not required           Last Anesthesia Record Vitals:  CRNA VITALS  3/29/2019 1604 - 3/29/2019 1704      3/29/2019             Pulse:  103    SpO2:  100 %    Resp Rate (observed):  4  (Abnormal)           Last PACU/Preop Vitals:  Vitals:    19 1645 19 1700 19 1723   BP: 143/77 137/74 117/75   Pulse: 98 95 72   Resp: 15 16 16   Temp:      SpO2: 96% 95% 100%         Electronically Signed By: Adolfo Guevara MD, 2019, 6:08 PM

## 2019-03-29 NOTE — OP NOTE
DATE OF OPERATION: March 29, 2019    PREOPERATIVE DIAGNOSIS: Gender dysphoria.    POSTOPERATIVE DIAGNOSIS: Gender dysphoria.    PROCEDURES PERFORMED:   1.  Bilateral simple complete mastectomy.  2.  Bilateral nipple reconstruction with free nipple grafts, size 2 x 2 cm each.    SURGEON: Jai Edward MD    ASSISTANT: Sim Mackey MD    ANESTHESIA: General with bilateral pectoralis blocks.    ESTIMATED BLOOD LOSS: 50 mL.    SPECIMENS: Bilateral breast tissue. Right breast tissue weight 176 g. Left breast tissue weight 199 g.    COMPLICATIONS: None.    DRAINS: Two 15 Welsh drains, one in each chest.    IMPLANTS: None.    INDICATIONS: Patient is a 29-year-old trans-man who prefers he him pronouns.  He transitioned a year ago.  Has been on hormone therapy for nearly a year as well.  Received a letter of support for top surgery.  Has history of binding their chest daily for over a year.  This is resulted in exacerbation of his asthma.  Mammogram showed BI-RADS Category 2.  Risks benefits and alternatives were discussed with the patient for either bilateral simple complete mastectomy with free nipple graft reconstruction versus bilateral reduction mammoplasty with an inferior pedicle to keep the nipples alive.  Patient considered his options thoroughly and ultimately chose to undergo bilateral simple complete mastectomy with free nipple graft reconstruction.  Risks benefits alternatives were discussed and the patient accepted the associated risks.    DESCRIPTION OF PROCEDURE: Informed consent was reviewed with patient and there were no further questions.  Patient elected to undergo bilateral simple complete mastectomy with free nipple graft reconstruction rather than bilateral reduction mammoplasty with an inferior pedicle.  The chest was then marked along the midline, bilateral mammary lines, inframammary folds, breast footprint, and the proposed superior breast incision. Patient then underwent bilateral pectoralis  blocks with Anesthesia. Patient was then taken to the operating room and placed in a supine position. Preoperative antibiotics were given, bilateral lower leg SCD's were placed, and general anesthesia was obtained. All pressure points were padded and arms placed on arm boards. The chest was then prepped and draped in a sterile fashion. A time out was performed.    We started on the right side.  120 cc of 1:1,000,000 epinephrine solution was injected into the superior subcutaneous tissue for hemostasis and also to delineate the breast capsule. A 2 x 2 cm nipple graft was excised sharply and stored in a saline moist gauze. The proposed incisions were made with a scalpel and carried down into the subcutaneous tissue with bovie cautery. From the inframammary fold incision, the breast tissue was lifted off the chest wall up to the preoperatively marked breast footprint using bovie cautery with care taken to leave the pectoralis fascia intact. Then through the superior incision the interval between the breast capsule and subcutaneous tissue was sharply dissected using facelift scissors. Dissection was carried medially to the sternum, superiorly to the clavicle, laterally to the axilla and lateral border of pectoralis, and inferiorly to the inframammary fold. The breast tissue was then removed in total to include its axillary tail of Pang and weighed. The inframammary fold was detached from the chest wall using bovie cautery. The incision was then temporarily closed with staples. The exact same procedure was then performed on the left side.    Patient was then transitioned to a sitting position. Contour was found to be flat and symmetric. New nipple locations were chosen just medial to the lateral border of the pectoralis muscles and oval shaped 2 x 1.5 cm nipples were marked. Symmetry of these were confirmed with notch to nipple and midline to nipple measurements. Patient was then placed back into a supine position. The  new nipple markings were de epithelialized sharply. This created a 2 x 2 cm wound bed due to tension. The temporary staples were removed. 15 Bengali drains were placed, one in each chest pocket, entering through the hair bearing region of the axillae. These were sewn in. Hemostasis was achieved and wounds were irrigated. Hemostatic powder was applied to the wound beds. Incisions were closed in layers using Monocryl suture. Nipple grafts were aggressively thinned with a pair of scissors. These were then placed over the de epithelialized areas with 5-0 fast absorbing gut running suture. Xeroform and mineral oil soaked cotton ball bolsters were placed over these with 3-0 chromic sutures. All counts were correct at the end of the case. A compression dressing was applied to the chest. Patient was then extubated, awakened, and transferred to the postoperative area in stable condition.    DISPOSITION: Home.

## 2019-04-02 ENCOUNTER — OFFICE VISIT (OUTPATIENT)
Dept: PLASTIC SURGERY | Facility: CLINIC | Age: 29
End: 2019-04-02
Payer: COMMERCIAL

## 2019-04-02 ENCOUNTER — TELEPHONE (OUTPATIENT)
Dept: PLASTIC SURGERY | Facility: CLINIC | Age: 29
End: 2019-04-02

## 2019-04-02 DIAGNOSIS — Z90.13 S/P MASTECTOMY, BILATERAL: Primary | ICD-10-CM

## 2019-04-02 LAB — COPATH REPORT: NORMAL

## 2019-04-02 NOTE — PATIENT INSTRUCTIONS
Care of Nipples and Chest Incisions    Change nipple dressings daily.  Take dressings off prior to showering and reapply after showering.  No direct shower spray on nipple grafts for 4 weeks.     Cut vaseline gauze to nipple size and place over nipple.  Place folded white gauze over vaseline gauze and cover with plastic dressing.  Do dressing changes for 2 more weeks.  If you continue to have drainage, continue the dressings until drainage stops.     Keep compression on for 2 more weeks. No lifting greater than 5 lbs for 4 weeks. Begin moisturizing your incisions with any non-scented, non-glittered lotion. You may peel off incisional tape at 3 weeks after your surgery date. Then apply silicone gel sheets to incisions.  These can be purchased on [employer] and at [employer].     At one month post op, baseline shoulder motion should return. You may start to exercise lightly at this time, gradually moving up to arm movement. Full shoulder motion should return by 8 weeks.      Schedule a post op appt with Dr. Edward in 8 weeks out at the pod in front of office.     When to call:  Sudden increase of swelling or pain on one side  Uncontrolled pain despite pain medication  Worsening of chest swelling   Separation of nipple from chest skin  Redness and warmth in chest area  Fever > 101     Contact the RN between 8-3:30 Mon-Fri with questions or concerns through my chart message via your doctor's name (most efficient) or call at [phone]. Your call or message will be returned same day.     For urgent medical issues that cannot wait, call [phone] Mon-Fri 8:-4:30.     After hours, weekends or holidays, call [phone] and ask to speak to the on [facility] plastic surgeon fellow.

## 2019-04-02 NOTE — LETTER
4/2/2019       RE: Mo Lopez  [address]  [place] [facility] [address]     Dear Colleague,    Thank you for referring your patient, Mo Lopez, to the [facility] at [facility]. Please see a copy of my visit note below.    Pt. comes into clinic today at the request of Dr. Jai Edward.    This service provided today was under the supervising provider of the day Dr. Edward, who was available if needed.    Reason for visit: Post op visit.  Pt returns 4 days after bilateral mastectomy with free nipple grafts.    Nipple grafts:  Nipple bolsters removed-good adherence to skin  Color pinkish brown  Incisions:  Well approximated, no drainage, no redness.  Chest symmetrical  Pain:  Denies- using Tylenol occasionally  Drains: Bilateral MIRZA drains < 20 ml for several days. Light serosanguineous drainage  Both removed.  Instructions:  See AVS  Return to clinic:  See Dr. Edward in 8 weeks    Makeda Corona, RN, BSN  Care Coordinator

## 2019-04-02 NOTE — PROGRESS NOTES
Pt. comes into clinic today at the request of Dr. Jai Edward.    This service provided today was under the supervising provider of the day Dr. Edward, who was available if needed.    Reason for visit: Post op visit.  Pt returns 4 days after bilateral mastectomy with free nipple grafts.    Nipple grafts:  Nipple bolsters removed-good adherence to skin  Color pinkish brown  Incisions:  Well approximated, no drainage, no redness.  Chest symmetrical  Pain:  Denies- using Tylenol occasionally  Drains: Bilateral MIRZA drains < 20 ml for several days. Light serosanguineous drainage  Both removed.  Instructions:  See AVS  Return to clinic:  See Dr. Edward in 8 weeks    Makeda Corona, RN, BSN  Care Coordinator

## 2019-04-09 ENCOUNTER — TELEPHONE (OUTPATIENT)
Dept: FAMILY MEDICINE | Facility: CLINIC | Age: 29
End: 2019-04-09

## 2019-04-09 NOTE — TELEPHONE ENCOUNTER
"Request for medication refill:    Per pharmacy, to determine if patient should start a daily inhaler. Patient refills multiple inhalers.        Providers if patient needs an appointment and you are willing to give a one month supply please refill for one month and  send a letter/MyChart using \".SMILLIMITEDREFILL\" .smillimited and route chart to \"P SMI \" (Giving one month refill in non controlled medications is strongly recommended before denial)    If refill has been denied, meaning absolutely no refills without visit, please complete the smart phrase \".smirxrefuse\" and route it to the \"P SMI MED REFILLS\"  pool to inform the patient and the pharmacy.    Cher Marshall, CMA        "

## 2019-06-17 DIAGNOSIS — R06.2 WHEEZING: ICD-10-CM

## 2019-06-17 RX ORDER — ALBUTEROL SULFATE 90 UG/1
2 AEROSOL, METERED RESPIRATORY (INHALATION) EVERY 6 HOURS PRN
Qty: 1 INHALER | Refills: 3 | Status: SHIPPED | OUTPATIENT
Start: 2019-06-17 | End: 2021-12-10

## 2019-06-17 NOTE — TELEPHONE ENCOUNTER
"Request for medication refill:albuterol (PROAIR HFA/PROVENTIL HFA/VENTOLIN HFA) 108 (90 Base) MCG/ACT inhaler    Providers if patient needs an appointment and you are willing to give a one month supply please refill for one month and  send a letter/MyChart using \".SMILLIMITEDREFILL\" .smillimited and route chart to \"P [facility] \" (Giving one month refill in non controlled medications is strongly recommended before denial)    If refill has been denied, meaning absolutely no refills without visit, please complete the smart phrase \".smirxrefuse\" and route it to the \"P SMI MED REFILLS\"  pool to inform the patient and the pharmacy.    Law Na, MA        "

## 2019-06-18 ENCOUNTER — PATIENT OUTREACH (OUTPATIENT)
Dept: PLASTIC SURGERY | Facility: CLINIC | Age: 29
End: 2019-06-18

## 2019-06-18 NOTE — PATIENT INSTRUCTIONS
Spoke with pt regarding appt today, 6/18/19 at 6:45pm with Dr. Edward. Apologized for inconvenience and explained that Dr. Edward was called to the OR for an urgent case and his clinic is being cancelled. Pt rescheduled for 6/25/19 at 5:45pm. Pt confirms appt date, time, and location. Suzette NINA RNCC

## 2019-06-25 ENCOUNTER — OFFICE VISIT (OUTPATIENT)
Dept: PLASTIC SURGERY | Facility: CLINIC | Age: 29
End: 2019-06-25
Payer: COMMERCIAL

## 2019-06-25 DIAGNOSIS — F64.0 GENDER DYSPHORIA IN ADULT: Primary | ICD-10-CM

## 2019-06-25 NOTE — PROGRESS NOTES
Patient returns for a postoperative follow-up after undergoing top surgery for gender dysphoria.    INTERVAL HISTORY: Patient reports he is happy he had surgery and has no regrets.    PHYSICAL EXAMINATION:  General: No acute distress.  Appears masculine.  Chest examination was performed in the presence of a chaperone.  This revealed well-healed nipple grafts and bilateral chest incisions.  The chest incisions are still strongly colored.  Contour is flat and symmetric bilaterally.  Overall vibe is very masculine.    ASSESSMENT: 3 months status post bilateral simple complete mastectomy with free nipple graft reconstruction as a form of top surgery for gender dysphoria.  Doing well.    PLAN: I have no activity restrictions for the patient.  He is to avoid sun for the next 3 to 6 months.  I will see the patient back in 9 months for a one-year postoperative follow-up.    Total time spent with patient was 15 min of which greater than 50% was in counseling.

## 2019-06-25 NOTE — LETTER
6/25/2019       RE: Mo Lopez  [address]  [facility] [address]     Dear Colleague,    Thank you for referring your patient, Mo Lopez, to the [facility] PLASTIC [facility] RECONSTRU[facility] SURGERY at [facility]. Please see a copy of my visit note below.    Patient returns for a postoperative follow-up after undergoing top surgery for gender dysphoria.    INTERVAL HISTORY: Patient reports he is happy he had surgery and has no regrets.    PHYSICAL EXAMINATION:  General: No acute distress.  Appears masculine.  Chest examination was performed in the presence of a chaperone.  This revealed well-healed nipple grafts and bilateral chest incisions.  The chest incisions are still strongly colored.  Contour is flat and symmetric bilaterally.  Overall vibe is very masculine.    ASSESSMENT: 3 months status post bilateral simple complete mastectomy with free nipple graft reconstruction as a form of top surgery for gender dysphoria.  Doing well.    PLAN: I have no activity restrictions for the patient.  He is to avoid sun for the next 3 to 6 months.  I will see the patient back in 9 months for a one-year postoperative follow-up.    Total time spent with patient was 15 min of which greater than 50% was in counseling.    Again, thank you for allowing me to participate in the care of your patient.      Sincerely,    Jai Edward MD

## 2019-07-26 DIAGNOSIS — F64.0 GENDER DYSPHORIA IN ADULT: ICD-10-CM

## 2019-07-26 NOTE — TELEPHONE ENCOUNTER
"Request for medication refill: BD nEEDLES 25gX 1.5 AND BD Luer Mery BD needles 18gx1     Providers if patient needs an appointment and you are willing to give a one month supply please refill for one month and  send a letter/MyChart using \".SMILLIMITEDREFILL\" .smillimited and route chart to \"P SMI \" (Giving one month refill in non controlled medications is strongly recommended before denial)    If refill has been denied, meaning absolutely no refills without visit, please complete the smart phrase \".smirxrefuse\" and route it to the \"P SMI MED REFILLS\"  pool to inform the patient and the pharmacy.    Darling Walter, CMA        "

## 2019-08-23 ENCOUNTER — OFFICE VISIT (OUTPATIENT)
Dept: FAMILY MEDICINE | Facility: CLINIC | Age: 29
End: 2019-08-23
Payer: COMMERCIAL

## 2019-08-23 VITALS
WEIGHT: 151 LBS | HEART RATE: 77 BPM | OXYGEN SATURATION: 98 % | BODY MASS INDEX: 25.16 KG/M2 | RESPIRATION RATE: 16 BRPM | DIASTOLIC BLOOD PRESSURE: 80 MMHG | SYSTOLIC BLOOD PRESSURE: 124 MMHG | TEMPERATURE: 97.9 F | HEIGHT: 65 IN

## 2019-08-23 DIAGNOSIS — F64.0 GENDER DYSPHORIA IN ADULT: Primary | ICD-10-CM

## 2019-08-23 LAB
ALBUMIN SERPL-MCNC: 4.7 MG/DL (ref 3.8–5)
ALP SERPL-CCNC: 77 U/L (ref 31.7–110.7)
ALT SERPL-CCNC: 11 U/L (ref 0–45)
AST SERPL-CCNC: 15.4 U/L (ref 0–55)
BILIRUB SERPL-MCNC: <0.4 MG/DL (ref 0.2–1.3)
BUN SERPL-MCNC: 7.2 MG/DL (ref 7–21)
CALCIUM SERPL-MCNC: 9.3 MG/DL (ref 8.5–10.1)
CHLORIDE SERPLBLD-SCNC: 103.1 MMOL/L (ref 98–110)
CHOLEST SERPL-MCNC: 174.8 MG/DL (ref 0–200)
CHOLEST/HDLC SERPL: 3.7 {RATIO} (ref 0–5)
CO2 SERPL-SCNC: 22.5 MMOL/L (ref 20–32)
CREAT SERPL-MCNC: 0.8 MG/DL (ref 0.7–1.3)
GFR SERPL CREATININE-BSD FRML MDRD: >90 ML/MIN/1.7 M2
GLUCOSE SERPL-MCNC: 104.9 MG'DL (ref 70–99)
HDLC SERPL-MCNC: 47.5 MG/DL
HEMOGLOBIN: 14.9 G/DL (ref 13.3–17.7)
LDLC SERPL CALC-MCNC: 108 MG/DL (ref 0–129)
POTASSIUM SERPL-SCNC: 3.9 MMOL/DL (ref 3.3–4.5)
PROT SERPL-MCNC: 7.5 G/DL (ref 6.8–8.8)
SODIUM SERPL-SCNC: 135 MMOL/L (ref 132.6–141.4)
TRIGL SERPL-MCNC: 95.5 MG/DL (ref 0–150)
VLDL CHOLESTEROL: 19.1 MG/DL (ref 7–32)

## 2019-08-23 RX ORDER — TESTOSTERONE CYPIONATE 200 MG/ML
40 INJECTION, SOLUTION INTRAMUSCULAR WEEKLY
Qty: 4 ML | Refills: 1 | Status: SHIPPED | OUTPATIENT
Start: 2019-08-23 | End: 2020-12-22

## 2019-08-23 ASSESSMENT — MIFFLIN-ST. JEOR: SCORE: 1586.81

## 2019-08-23 NOTE — PROGRESS NOTES
Preceptor Attestation:   Patient seen, evaluated and discussed with the resident. I have verified the content of the note, which accurately reflects my assessment of the patient and the plan of care.   Supervising Physician:  Edwin Pritchett MD MD

## 2019-08-23 NOTE — PROGRESS NOTES
"       ERIC Hassan is a 29 year old individual that uses pronouns He/Him/His/Himself that presents today for follow up of:  masculinizing hormone therapy.     Gender identity: Transmale    Any special concerns today?  no current concerns    On hormones?  YES +++ Shot day of the week? Monday      Due for labs?  Yes      +++ Refills of meds needed?  Yes    Has been putting shots off because doesn't like giving self because nervous and painful. No reactions. Occasional bruising.   Had top surgery earlier this year. Went well. Happy with results.If in    ---    Past Surgical History:   Procedure Laterality Date     TRANSGENDER MASTECTOMY Bilateral 3/29/2019    Procedure: Bilateral Simple Complete  Mammoplasty with Free Nipple Grafting;  Surgeon: Jai Edward MD;  Location:  OR     wisdom teeth  2010       Patient Active Problem List   Diagnosis     TODD (generalized anxiety disorder)     Moderate episode of recurrent major depressive disorder (H)     Gender dysphoria in adult     Healthcare maintenance       Current Outpatient Medications   Medication Sig Dispense Refill     Acetaminophen (TYLENOL PO) Take 325 mg by mouth as needed for mild pain or fever       albuterol (PROAIR HFA/PROVENTIL HFA/VENTOLIN HFA) 108 (90 Base) MCG/ACT inhaler Inhale 2 puffs into the lungs every 6 hours as needed for shortness of breath / dyspnea or wheezing 1 Inhaler 3     cetirizine (ZYRTEC) 10 MG tablet Take 1 tablet (10 mg) by mouth daily       needle, disp, 18G X 1\" MISC Use to draw up hormones once weekly 25 each 3     Needle, Disp, 25G X 1\" MISC Use to administer hormone IM once weekly 50 each 3     sertraline (ZOLOFT) 100 MG tablet Take 1 tablet (100 mg) by mouth daily 30 tablet 11     spacer (OPTICHAMBER VICENTE) holding chamber Holding/spacer device to use with inhaler. 1 each 0     syringe, disposable, 1 ML MISC Use once weekly to draw up hormones 50 each 3     testosterone cypionate (DEPOTESTOSTERONE) 200 MG/ML injection " "Inject 0.2 mLs (40 mg) into the muscle once a week Needs 4 ml for 84 day supply 4 mL 1       History   Smoking Status     Never Smoker   Smokeless Tobacco     Never Used          Allergies   Allergen Reactions     Cats      Sneezing and watery eyes     Omnicef [Cefdinir] Hives     SOB. Swelling of face     Penicillins Hives     Percocet [Oxycodone-Acetaminophen] Anxiety     Bad full body restlessness and anxiety when stopped 5mg/day       Problem, Medication and Allergy Lists were reviewed and are current..         Review of Systems:        General    Fat redistribution: YES    Weight change: YES- gained HEENT    Voice change: YES     Cardiovascular (CV)    Chest Pains: no    Shortness of breath: no Chest    Decreased exercise tolerance:  no    Breast changes/development: not applicable     Gastrointestinal (GI)    Abdominal pain: no    Change in appetite: no Skin    Acne or oily skin: YES    Change in hair: YES     Genitourinary ()    Abnormal vaginal bleeding: YES- spotting     Decreased spontaneous erections: not applicable    Change in libido: no    New sexual partners: YES- female with penis Musculoskeletal    Leg pain or swelling: no     Psychiatric (Psych)    Depression: On anti-depressants    Anxiety/Panic: YES- around the shots, had a panic attack    Mood:  \"fine\"                    Physical Exam:     Vitals:    08/23/19 0905   BP: 124/80   Pulse: 77   Resp: 16   Temp: 97.9  F (36.6  C)   TempSrc: Oral   SpO2: 98%   Weight: 68.5 kg (151 lb)   Height: 1.651 m (5' 5\")     BMI= Body mass index is 25.13 kg/m .   Wt Readings from Last 10 Encounters:   08/23/19 68.5 kg (151 lb)   03/29/19 68 kg (150 lb)   03/22/19 68.9 kg (152 lb)   02/21/19 70.3 kg (155 lb)   01/15/19 67.2 kg (148 lb 1.6 oz)   12/04/18 67.3 kg (148 lb 6.4 oz)   08/28/18 65.7 kg (144 lb 12.8 oz)   06/26/18 63.5 kg (140 lb)   05/14/18 60.5 kg (133 lb 6.4 oz)   04/17/18 61.1 kg (134 lb 12.8 oz)     Appearance: Male appearance and " dress    GENERAL:: healthy, alert and no distress  RESP: lungs clear to auscultation - no rales, no rhonchi, no wheezes  CV: regular rates and rhythm, normal S1 S2, no S3 or S4 and no murmur, no click or rub -  ABDOMEN: soft, no tenderness, no  hepatosplenomegaly, no masses, normal bowel sounds  MS: extremities normal- no gross deformities noted, no edema  SKIN: no suspicious lesions, no rashes  Affect: Appropriate/mood-congruent           Labs:   Labs ordered and pending    Assessment and Plan     1. Gender dysphoria in adult  [facility] Pt has been on T for just over a year.   We discussed using SubQ instead of IM as a possibility, recent research has shown this to be just as effective, as patient is having anxiety along with giving self the shot that is affecting his care.  - Testosterone total  - Hemoglobin (HGB) ([facility])  - Lipid Cascade ([facility])  - Comprehensive Metabolic Panel ([facility])    Contraception:  condoms       Counselled patient about controlled substances: Yes.     Follow up:  Follow up in 1 year.  [facility]  Questions were elicited and answered.     Ania Jordan MD

## 2019-08-23 NOTE — PATIENT INSTRUCTIONS
"Here is the plan from today's visit    1. Gender dysphoria in adult  If interested in SubQ testosterone, please feel free to make an appointment with me to talk about it.   Follow up in a year for labs and wellness exam.  - Testosterone total  - Hemoglobin (HGB) ([facility])  - Lipid Cascade ([facility])  - Comprehensive Metabolic Panel ([facility])  - needle, disp, 18G X 1\" MISC; Use to draw up hormones once weekly  Dispense: 50 each; Refill: 3  - Needle, Disp, 25G X 1-1/2\" MISC; As directed  Dispense: 50 each; Refill: 3  - testosterone cypionate (DEPOTESTOSTERONE) 200 MG/ML injection; Inject 0.2 mLs (40 mg) into the muscle once a week Needs 4 ml for 84 day supply  Dispense: 4 mL; Refill: 1      Please call or return to clinic if your symptoms don't go away.    Follow up plan  Please make a clinic appointment for follow up with me (YOAV BROWNING) in 1  year or with your primary physician Ema Arechiga MD in 1 year for labs and wellness exam.    Thank you for coming to [facility] today.  Lab Testing:  **If you had lab testing today and your results are reassuring or normal they will be mailed to you or sent through [employer] within 7 days.   **If the lab tests need quick action we will call you with the results.  The phone number we will call with results is # [phone] (home) . If this is not the best number please call our clinic and change the number.  Medication Refills:  If you need any refills please call your pharmacy and they will contact us.   If you need to  your refill at a new pharmacy, please contact the new pharmacy directly. The new pharmacy will help you get your medications transferred faster.   Scheduling:  If you have any concerns about today's visit or wish to schedule another appointment please call our office during normal business hours [phone] (8-5:00 M-F)  If a referral was made to a [facility] and you don't get a call from central scheduling " please call [phone].  If a Mammogram was ordered for you at [facility] call [phone] to schedule or change your appointment.  If you had an XRay/CT/Ultrasound/MRI ordered the number is [phone] to schedule or change your radiology appointment.   Medical Concerns:  If you have urgent medical concerns please call [phone] at any time of the day.    Ania Jordan MD

## 2019-08-27 LAB — TESTOST SERPL-MCNC: 464 NG/DL (ref 240–950)

## 2019-10-01 ENCOUNTER — HEALTH MAINTENANCE LETTER (OUTPATIENT)
Age: 29
End: 2019-10-01

## 2019-10-24 DIAGNOSIS — F33.1 MODERATE EPISODE OF RECURRENT MAJOR DEPRESSIVE DISORDER (H): ICD-10-CM

## 2019-10-24 RX ORDER — SERTRALINE HYDROCHLORIDE 100 MG/1
100 TABLET, FILM COATED ORAL DAILY
Qty: 90 TABLET | Refills: 3 | Status: SHIPPED | OUTPATIENT
Start: 2019-10-24 | End: 2020-09-21

## 2019-10-24 NOTE — TELEPHONE ENCOUNTER

## 2019-10-29 ENCOUNTER — HEALTH MAINTENANCE LETTER (OUTPATIENT)
Age: 29
End: 2019-10-29

## 2020-10-27 DIAGNOSIS — F33.1 MODERATE EPISODE OF RECURRENT MAJOR DEPRESSIVE DISORDER (H): ICD-10-CM

## 2020-10-27 NOTE — TELEPHONE ENCOUNTER

## 2020-10-29 RX ORDER — SERTRALINE HYDROCHLORIDE 100 MG/1
100 TABLET, FILM COATED ORAL DAILY
Qty: 90 TABLET | Refills: 0 | Status: SHIPPED | OUTPATIENT
Start: 2020-10-29 | End: 2020-12-22

## 2020-10-30 NOTE — TELEPHONE ENCOUNTER
-   Please call Mo to schedule a follow up visit    Provider: Hawk  Timeframe: next available   Visit Type: Video visit  Reason for visit: depression follow up    Thanks,  Ema Arechiga MD

## 2020-12-22 ENCOUNTER — OFFICE VISIT (OUTPATIENT)
Dept: FAMILY MEDICINE | Facility: CLINIC | Age: 30
End: 2020-12-22
Payer: COMMERCIAL

## 2020-12-22 VITALS
BODY MASS INDEX: 25.79 KG/M2 | DIASTOLIC BLOOD PRESSURE: 72 MMHG | OXYGEN SATURATION: 92 % | HEART RATE: 78 BPM | TEMPERATURE: 97.7 F | RESPIRATION RATE: 16 BRPM | SYSTOLIC BLOOD PRESSURE: 119 MMHG | WEIGHT: 155 LBS

## 2020-12-22 DIAGNOSIS — N95.2 VAGINAL ATROPHY: Primary | ICD-10-CM

## 2020-12-22 DIAGNOSIS — F64.0 GENDER DYSPHORIA IN ADULT: ICD-10-CM

## 2020-12-22 DIAGNOSIS — Z11.59 ENCOUNTER FOR HEPATITIS C SCREENING TEST FOR LOW RISK PATIENT: ICD-10-CM

## 2020-12-22 DIAGNOSIS — F33.1 MODERATE EPISODE OF RECURRENT MAJOR DEPRESSIVE DISORDER (H): ICD-10-CM

## 2020-12-22 DIAGNOSIS — Z23 NEED FOR PROPHYLACTIC VACCINATION AND INOCULATION AGAINST INFLUENZA: ICD-10-CM

## 2020-12-22 DIAGNOSIS — Z51.81 ENCOUNTER FOR THERAPEUTIC DRUG MONITORING: ICD-10-CM

## 2020-12-22 PROCEDURE — 90471 IMMUNIZATION ADMIN: CPT | Performed by: FAMILY MEDICINE

## 2020-12-22 PROCEDURE — 99214 OFFICE O/P EST MOD 30 MIN: CPT | Mod: 25 | Performed by: FAMILY MEDICINE

## 2020-12-22 PROCEDURE — 90686 IIV4 VACC NO PRSV 0.5 ML IM: CPT | Performed by: FAMILY MEDICINE

## 2020-12-22 RX ORDER — SERTRALINE HYDROCHLORIDE 100 MG/1
100 TABLET, FILM COATED ORAL DAILY
Qty: 90 TABLET | Refills: 3 | Status: SHIPPED | OUTPATIENT
Start: 2020-12-22 | End: 2021-12-10

## 2020-12-22 RX ORDER — ESTRADIOL 10 UG/1
10 INSERT VAGINAL DAILY
Qty: 14 TABLET | Refills: 0 | Status: SHIPPED | OUTPATIENT
Start: 2020-12-22 | End: 2021-01-05

## 2020-12-22 RX ORDER — TESTOSTERONE CYPIONATE 200 MG/ML
90 INJECTION, SOLUTION INTRAMUSCULAR WEEKLY
Qty: 13 ML | Refills: 1 | Status: SHIPPED | OUTPATIENT
Start: 2020-12-22 | End: 2021-12-10

## 2020-12-22 ASSESSMENT — ANXIETY QUESTIONNAIRES
7. FEELING AFRAID AS IF SOMETHING AWFUL MIGHT HAPPEN: NOT AT ALL
2. NOT BEING ABLE TO STOP OR CONTROL WORRYING: SEVERAL DAYS
IF YOU CHECKED OFF ANY PROBLEMS ON THIS QUESTIONNAIRE, HOW DIFFICULT HAVE THESE PROBLEMS MADE IT FOR YOU TO DO YOUR WORK, TAKE CARE OF THINGS AT HOME, OR GET ALONG WITH OTHER PEOPLE: NOT DIFFICULT AT ALL
GAD7 TOTAL SCORE: 6
5. BEING SO RESTLESS THAT IT IS HARD TO SIT STILL: NOT AT ALL
3. WORRYING TOO MUCH ABOUT DIFFERENT THINGS: SEVERAL DAYS
6. BECOMING EASILY ANNOYED OR IRRITABLE: SEVERAL DAYS
1. FEELING NERVOUS, ANXIOUS, OR ON EDGE: MORE THAN HALF THE DAYS

## 2020-12-22 ASSESSMENT — PATIENT HEALTH QUESTIONNAIRE - PHQ9: 5. POOR APPETITE OR OVEREATING: SEVERAL DAYS

## 2020-12-22 NOTE — PROGRESS NOTES
"       ERIC Hassan is a 30 year old individual that uses pronouns He/Him/His/Himself that presents today for follow up of:  masculinizing hormone therapy.     Gender identity: male    Any special concerns today?  no current concerns    Masculinizing Therapy  May 2018 - Testosterone started 40mg IM weekly  8/23/19 [id number]  Lost insurance, going to , dose increased to 90mg subcutaneous weekly since at least October 2020 if not before.    Due for labs    Gender affirmation is being sought in these other ways:   Transmastetomy 03/2019    Depression/Anxiety    Due for pap    ---    Past Surgical History:   Procedure Laterality Date     TRANSGENDER MASTECTOMY Bilateral 3/29/2019    Procedure: Bilateral Simple Complete  Mammoplasty with Free Nipple Grafting;  Surgeon: Jai Edward MD;  Location: [facility] OR     wisdom teeth  2010       Patient Active Problem List   Diagnosis     TODD (generalized anxiety disorder)     Moderate episode of recurrent major depressive disorder (H)     Gender dysphoria in adult     Healthcare maintenance       Current Outpatient Medications   Medication Sig Dispense Refill     Acetaminophen (TYLENOL PO) Take 325 mg by mouth as needed for mild pain or fever       albuterol (PROAIR HFA/PROVENTIL HFA/VENTOLIN HFA) 108 (90 Base) MCG/ACT inhaler Inhale 2 puffs into the lungs every 6 hours as needed for shortness of breath / dyspnea or wheezing 1 Inhaler 3     cetirizine (ZYRTEC) 10 MG tablet Take 1 tablet (10 mg) by mouth daily       needle, disp, 18G X 1\" MISC Use to draw up hormones once weekly 50 each 3     Needle, Disp, 25G X 1-1/2\" MISC As directed 50 each 3     sertraline (ZOLOFT) 100 MG tablet Take 1 tablet (100 mg) by mouth daily 90 tablet 0     spacer (OPTICHAMBER VICENTE) holding chamber Holding/spacer device to use with inhaler. 1 each 0     syringe, disposable, 1 ML MISC Use once weekly to draw up hormones 50 each 3     testosterone cypionate (DEPOTESTOSTERONE) 200 MG/ML injection Inject " "0.2 mLs (40 mg) into the muscle once a week Needs 4 ml for 84 day supply 4 mL 1       History   Smoking Status     Never Smoker   Smokeless Tobacco     Never Used          Allergies   Allergen Reactions     Cats      Sneezing and watery eyes     Omnicef [Cefdinir] Hives     SOB. Swelling of face     Penicillins Hives     Percocet [Oxycodone-Acetaminophen] Anxiety     Bad full body restlessness and anxiety when stopped 5mg/day       Problem, Medication and Allergy Lists were reviewed and are current..         Review of Systems:        General    Fat redistribution: YES    Weight change: steady (despite pandemic) HEENT    Voice change: YES     Cardiovascular (CV)    Chest Pains: no    Shortness of breath: no Chest    Decreased exercise tolerance:  Doesn't exercise     Gastrointestinal (GI)    Abdominal pain: no    Change in appetite: no Skin    Acne or oily skin: no    Change in hair: no     Genitourinary ()    Abnormal vaginal bleeding: no     Change in libido: steady    New sexual partners: no, no current partners Musculoskeletal    Leg pain or swelling: no     Psychiatric (Psych)    Depression: \"fine, manageble\"    Anxiety/Panic: anxiety is pandemic-related    Mood:  \"fine\" (pandemic aside)      Therapy every other week            Physical Exam:   There were no vitals filed for this visit.  BMI= There is no height or weight on file to calculate BMI.   Wt Readings from Last 10 Encounters:   08/23/19 68.5 kg (151 lb)   03/29/19 68 kg (150 lb)   03/22/19 68.9 kg (152 lb)   02/21/19 70.3 kg (155 lb)   01/15/19 67.2 kg (148 lb 1.6 oz)   12/04/18 67.3 kg (148 lb 6.4 oz)   08/28/18 65.7 kg (144 lb 12.8 oz)   06/26/18 63.5 kg (140 lb)   05/14/18 60.5 kg (133 lb 6.4 oz)   04/17/18 61.1 kg (134 lb 12.8 oz)       GENERAL:: healthy, alert and no distress  Affect: Appropriate/mood-congruent           Labs:       Assessment and Plan         Here is the plan from today's visit    1. Gender dysphoria in adult    No dose change " for now, labs due  - testosterone cypionate (DEPOTESTOSTERONE) 200 MG/ML injection; Inject 0.45 mLs (90 mg) Subcutaneous once a week  Dispense: 13 mL; Refill: 1    Schedule lab-only visit for midcycle (atleast 3 days before and after a shot day)  Come fasting to labs, nothing but water for 8 hours before  Encounter for therapeutic drug monitoring  - Testosterone Total; Future  - Hepatic Panel; Future  - CBC with Diff Plt; Future  - Lipid Cascade; Future  - Glucose; Future    2. Moderate episode of recurrent major depressive disorder (H)  Stable, well controlled with zoloft and every other week therapy  - sertraline (ZOLOFT) 100 MG tablet; Take 1 tablet (100 mg) by mouth daily  Dispense: 90 tablet; Refill: 3    3. Vaginal atrophy  - estradiol (VAGIFEM) 10 MCG TABS vaginal tablet; Place 1 tablet (10 mcg) vaginally daily for 14 days Prior to procedure  Dispense: 14 tablet; Refill: 0  Prior to pap  Schedule pap smear sometime in the next year - start vaginal estrogen pills 2 weeks before.     Next hormone visit in 1 year, or in 3 months if we do a dose adjustment.     [facility]  Questions were elicited and answered.     Ema Arechiga MD

## 2020-12-22 NOTE — PATIENT INSTRUCTIONS
Schedule lab-only visit for midcycle (atleast 3 days before and after a shot day)    Come fasting to labs, nothing but water for 8 hours before  Schedule pap smear sometime in the next year - star vaginal estrogen pills 2 weeks before.     Otherwise, next hormone visit in 1 year, or in 3 months if we do a dose adjustment.     Here is the plan from today's visit    1. Gender dysphoria in adult  - testosterone cypionate (DEPOTESTOSTERONE) 200 MG/ML injection; Inject 0.45 mLs (90 mg) Subcutaneous once a week  Dispense: 13 mL; Refill: 1    2. Moderate episode of recurrent major depressive disorder (H)  - sertraline (ZOLOFT) 100 MG tablet; Take 1 tablet (100 mg) by mouth daily  Dispense: 90 tablet; Refill: 3    3. Vaginal atrophy  - estradiol (VAGIFEM) 10 MCG TABS vaginal tablet; Place 1 tablet (10 mcg) vaginally daily for 14 days Prior to procedure  Dispense: 14 tablet; Refill: 0    4. Encounter for therapeutic drug monitoring  - Testosterone Total; Future  - Hepatic Panel; Future  - CBC with Diff Plt; Future  - Lipid Cascade; Future  - Glucose; Future    Thank you for coming to [facility] today.  Lab Testing:  **If you had lab testing today and your results are reassuring or normal they will be mailed to you or sent through [employer] within 7 days.   **If the lab tests need quick action we will call you with the results.  The phone number we will call with results is # [phone] (home) . If this is not the best number please call our clinic and change the number.  Medication Refills:  If you need any refills please call your pharmacy and they will contact us.   If you need to  your refill at a new pharmacy, please contact the new pharmacy directly. The new pharmacy will help you get your medications transferred faster.   Scheduling:  If you have any concerns about today's visit or wish to schedule another appointment please call our office during normal business hours [phone] (8-5:00 M-F)  If a  referral was made to a [facility] and you don't get a call from central scheduling please call [phone].  If a Mammogram was ordered for you at [facility] call [phone] to schedule or change your appointment.  If you had an XRay/CT/Ultrasound/MRI ordered the number is [phone] to schedule or change your radiology appointment.   Medical Concerns:  If you have urgent medical concerns please call [phone] at any time of the day.    Ema Arechiga MD

## 2020-12-23 ASSESSMENT — ANXIETY QUESTIONNAIRES: GAD7 TOTAL SCORE: 6

## 2021-01-15 ENCOUNTER — HEALTH MAINTENANCE LETTER (OUTPATIENT)
Age: 31
End: 2021-01-15

## 2021-10-24 ENCOUNTER — HEALTH MAINTENANCE LETTER (OUTPATIENT)
Age: 31
End: 2021-10-24

## 2021-12-06 ENCOUNTER — LAB (OUTPATIENT)
Dept: LAB | Facility: CLINIC | Age: 31
End: 2021-12-06
Payer: COMMERCIAL

## 2021-12-06 DIAGNOSIS — Z51.81 ENCOUNTER FOR THERAPEUTIC DRUG MONITORING: ICD-10-CM

## 2021-12-06 DIAGNOSIS — Z11.59 ENCOUNTER FOR HEPATITIS C SCREENING TEST FOR LOW RISK PATIENT: ICD-10-CM

## 2021-12-06 LAB
ALBUMIN SERPL-MCNC: 3.9 G/DL (ref 3.4–5)
ALP SERPL-CCNC: 104 U/L (ref 40–150)
ALT SERPL W P-5'-P-CCNC: 21 U/L (ref 0–70)
AST SERPL W P-5'-P-CCNC: 16 U/L (ref 0–45)
BASOPHILS # BLD AUTO: 0 10E3/UL (ref 0–0.2)
BASOPHILS NFR BLD AUTO: 0 %
BILIRUB DIRECT SERPL-MCNC: <0.1 MG/DL (ref 0–0.2)
BILIRUB SERPL-MCNC: 0.3 MG/DL (ref 0.2–1.3)
CHOLEST SERPL-MCNC: 199 MG/DL
EOSINOPHIL # BLD AUTO: 0.3 10E3/UL (ref 0–0.7)
EOSINOPHIL NFR BLD AUTO: 5 %
ERYTHROCYTE [DISTWIDTH] IN BLOOD BY AUTOMATED COUNT: 12.4 % (ref 10–15)
FASTING STATUS PATIENT QL REPORTED: YES
FASTING STATUS PATIENT QL REPORTED: YES
GLUCOSE BLD-MCNC: 86 MG/DL (ref 70–99)
HCT VFR BLD AUTO: 47.2 %
HCV AB SERPL QL IA: NONREACTIVE
HDLC SERPL-MCNC: 41 MG/DL
HGB BLD-MCNC: 15.8 G/DL (ref 11.7–17.7)
IMM GRANULOCYTES # BLD: 0 10E3/UL
IMM GRANULOCYTES NFR BLD: 0 %
LDLC SERPL CALC-MCNC: 138 MG/DL
LYMPHOCYTES # BLD AUTO: 2.2 10E3/UL (ref 0.8–5.3)
LYMPHOCYTES NFR BLD AUTO: 41 %
MCH RBC QN AUTO: 30 PG (ref 26.5–33)
MCHC RBC AUTO-ENTMCNC: 33.5 G/DL (ref 31.5–36.5)
MCV RBC AUTO: 90 FL (ref 78–100)
MONOCYTES # BLD AUTO: 0.3 10E3/UL (ref 0–1.3)
MONOCYTES NFR BLD AUTO: 6 %
NEUTROPHILS # BLD AUTO: 2.6 10E3/UL (ref 1.6–8.3)
NEUTROPHILS NFR BLD AUTO: 48 %
NONHDLC SERPL-MCNC: 158 MG/DL
PLATELET # BLD AUTO: 276 10E3/UL (ref 150–450)
PROT SERPL-MCNC: 7.6 G/DL (ref 6.8–8.8)
RBC # BLD AUTO: 5.26 10E6/UL (ref 3.8–5.9)
TRIGL SERPL-MCNC: 101 MG/DL
WBC # BLD AUTO: 5.5 10E3/UL (ref 4–11)

## 2021-12-06 PROCEDURE — 85025 COMPLETE CBC W/AUTO DIFF WBC: CPT | Performed by: FAMILY MEDICINE

## 2021-12-06 PROCEDURE — 80061 LIPID PANEL: CPT | Performed by: FAMILY MEDICINE

## 2021-12-06 PROCEDURE — 82947 ASSAY GLUCOSE BLOOD QUANT: CPT | Performed by: FAMILY MEDICINE

## 2021-12-06 PROCEDURE — 36415 COLL VENOUS BLD VENIPUNCTURE: CPT | Performed by: FAMILY MEDICINE

## 2021-12-06 PROCEDURE — 80076 HEPATIC FUNCTION PANEL: CPT | Performed by: FAMILY MEDICINE

## 2021-12-06 PROCEDURE — 84403 ASSAY OF TOTAL TESTOSTERONE: CPT

## 2021-12-06 PROCEDURE — 86803 HEPATITIS C AB TEST: CPT

## 2021-12-08 LAB — TESTOST SERPL-MCNC: 727 NG/DL (ref 8–950)

## 2021-12-10 ENCOUNTER — OFFICE VISIT (OUTPATIENT)
Dept: FAMILY MEDICINE | Facility: CLINIC | Age: 31
End: 2021-12-10
Payer: COMMERCIAL

## 2021-12-10 VITALS
TEMPERATURE: 98.6 F | DIASTOLIC BLOOD PRESSURE: 71 MMHG | RESPIRATION RATE: 16 BRPM | SYSTOLIC BLOOD PRESSURE: 104 MMHG | OXYGEN SATURATION: 95 % | WEIGHT: 153.2 LBS | HEART RATE: 71 BPM | BODY MASS INDEX: 25.49 KG/M2

## 2021-12-10 DIAGNOSIS — N95.2 VAGINAL ATROPHY: ICD-10-CM

## 2021-12-10 DIAGNOSIS — Z23 NEED FOR PROPHYLACTIC VACCINATION AND INOCULATION AGAINST INFLUENZA: ICD-10-CM

## 2021-12-10 DIAGNOSIS — F33.1 MODERATE EPISODE OF RECURRENT MAJOR DEPRESSIVE DISORDER (H): ICD-10-CM

## 2021-12-10 DIAGNOSIS — F41.1 GAD (GENERALIZED ANXIETY DISORDER): ICD-10-CM

## 2021-12-10 DIAGNOSIS — Z00.00 HEALTHCARE MAINTENANCE: ICD-10-CM

## 2021-12-10 DIAGNOSIS — F64.0 GENDER DYSPHORIA IN ADULT: Primary | ICD-10-CM

## 2021-12-10 DIAGNOSIS — R06.2 WHEEZING: ICD-10-CM

## 2021-12-10 PROCEDURE — 90471 IMMUNIZATION ADMIN: CPT | Performed by: STUDENT IN AN ORGANIZED HEALTH CARE EDUCATION/TRAINING PROGRAM

## 2021-12-10 PROCEDURE — 90686 IIV4 VACC NO PRSV 0.5 ML IM: CPT | Performed by: STUDENT IN AN ORGANIZED HEALTH CARE EDUCATION/TRAINING PROGRAM

## 2021-12-10 PROCEDURE — 99214 OFFICE O/P EST MOD 30 MIN: CPT | Mod: 25 | Performed by: STUDENT IN AN ORGANIZED HEALTH CARE EDUCATION/TRAINING PROGRAM

## 2021-12-10 RX ORDER — SERTRALINE HYDROCHLORIDE 100 MG/1
100 TABLET, FILM COATED ORAL DAILY
Qty: 90 TABLET | Refills: 3 | Status: SHIPPED | OUTPATIENT
Start: 2021-12-10 | End: 2024-01-09

## 2021-12-10 RX ORDER — TESTOSTERONE CYPIONATE 200 MG/ML
90 INJECTION, SOLUTION INTRAMUSCULAR WEEKLY
Qty: 13 ML | Refills: 1 | Status: CANCELLED | OUTPATIENT
Start: 2021-12-10

## 2021-12-10 RX ORDER — TESTOSTERONE CYPIONATE 200 MG/ML
90 INJECTION, SOLUTION INTRAMUSCULAR WEEKLY
Qty: 13 ML | Refills: 1 | Status: SHIPPED | OUTPATIENT
Start: 2021-12-10 | End: 2022-07-08

## 2021-12-10 RX ORDER — ESTRADIOL 10 UG/1
10 INSERT VAGINAL DAILY
Qty: 14 TABLET | Refills: 0 | Status: SHIPPED | OUTPATIENT
Start: 2021-12-10 | End: 2021-12-16

## 2021-12-10 RX ORDER — ALBUTEROL SULFATE 90 UG/1
2 AEROSOL, METERED RESPIRATORY (INHALATION) EVERY 6 HOURS PRN
Qty: 8.5 G | Refills: 1 | Status: SHIPPED | OUTPATIENT
Start: 2021-12-10

## 2021-12-10 RX ORDER — INHALER, ASSIST DEVICES
SPACER (EA) MISCELLANEOUS
Qty: 1 EACH | Refills: 0 | Status: SHIPPED | OUTPATIENT
Start: 2021-12-10

## 2021-12-10 ASSESSMENT — PATIENT HEALTH QUESTIONNAIRE - PHQ9
5. POOR APPETITE OR OVEREATING: SEVERAL DAYS
SUM OF ALL RESPONSES TO PHQ QUESTIONS 1-9: 8

## 2021-12-10 ASSESSMENT — ANXIETY QUESTIONNAIRES
1. FEELING NERVOUS, ANXIOUS, OR ON EDGE: MORE THAN HALF THE DAYS
5. BEING SO RESTLESS THAT IT IS HARD TO SIT STILL: MORE THAN HALF THE DAYS
3. WORRYING TOO MUCH ABOUT DIFFERENT THINGS: SEVERAL DAYS
6. BECOMING EASILY ANNOYED OR IRRITABLE: SEVERAL DAYS
7. FEELING AFRAID AS IF SOMETHING AWFUL MIGHT HAPPEN: NOT AT ALL
2. NOT BEING ABLE TO STOP OR CONTROL WORRYING: SEVERAL DAYS
IF YOU CHECKED OFF ANY PROBLEMS ON THIS QUESTIONNAIRE, HOW DIFFICULT HAVE THESE PROBLEMS MADE IT FOR YOU TO DO YOUR WORK, TAKE CARE OF THINGS AT HOME, OR GET ALONG WITH OTHER PEOPLE: SOMEWHAT DIFFICULT
GAD7 TOTAL SCORE: 8

## 2021-12-10 NOTE — PROGRESS NOTES
Preceptor Attestation:  Patient seen and evaluated in person. I discussed the patient with the resident. I have verified the content of the note, which accurately reflects my assessment of the patient and the plan of care.   Supervising Physician:  Nicole Worrell DO

## 2021-12-10 NOTE — PROGRESS NOTES
Assessment & Plan     Gender dysphoria in adult  31-year-old transgender male presenting for follow-up of gender affirming hormone therapy.  He has been on testosterone for about 3 years, doing well.  No dose changes over the past year.  Reviewed recent labs with patient, no significant concerns, T level in goal range at 727.  Discussed that after 2 to 3 years of transition, often drop to the lower end with goal T level closer to 300, though certainly will defer to ongoing discussions between patient/PCP. LDL remains a bit elevated, total cholesterol normal- motivated for lifestyle changes. No other cardiovascular risk factors.  No dose adjustment made today.  - testosterone cypionate (DEPOTESTOSTERONE) 200 MG/ML injection; Inject 0.45 mLs (90 mg) Subcutaneous once a week    Wheezing, stable  History of wheezing with triggers being primarily allergies which are well controlled with Zyrtec.  Rarely uses albuterol (last refill 2019).   - albuterol (PROAIR HFA/PROVENTIL HFA/VENTOLIN HFA) 108 (90 Base) MCG/ACT inhaler; Inhale 2 puffs into the lungs every 6 hours as needed for shortness of breath / dyspnea or wheezing  - spacer (OPTICHAMBER VICENTE) holding chamber; Holding/spacer device to use with inhaler.    Vaginal atrophy  Has never had pap, last visit PCP prescribed estradiol for vaginal atrophy prior to pap within the next year, offered today and declined, new estradiol Rx sent and encouraged patient to follow up with PCP  - estradiol (VAGIFEM) 10 MCG TABS vaginal tablet; Place 1 tablet (10 mcg) vaginally daily for 14 days Prior to pap/procedure    TODD (generalized anxiety disorder)  Moderate episode of recurrent major depressive disorder (H)  Stable, well controlled with zoloft and every other week therapy. Notes he has been inconsistent with medication, discussed methods of remembering to take daily. GAD7= 8, PHQ9 = 8 today.  - sertraline (ZOLOFT) 100 MG tablet; Take 1 tablet (100 mg) by mouth  daily    Headaches   Reports recent increased frequency of headaches.  Clinical signs and symptoms most consistent with tension headaches related to neck muscle tightness and/or migraines given his prior history.  Does not feel like they are at a point that he requires abortive medication (previously used Imitrex).  No red flag symptoms warranting imaging at this time abilities were discussed and encouraged patient to let us know if he develops any of these.    Healthcare maintenance:  - Flu shot given today   - pap discussed as above    Return in about 1 year (around 12/10/2022) for hormone follow up, earliest convenience for preventative care visit with pap with PCP.    Bree Gamez MD  [facility] SHRUTHI Hassan is a 31 year old individual that uses pronouns He/Him/His/Himself that presents today for follow up of:  masculinizing hormone therapy. Gender identity: male    HPI     Any special concerns today?  no current concerns  Patient presents with:  Refill Request: needs Testosterone, Spacer, and albuterol inhaler refilled   Imm/Inj: Flu Shot    On hormones?  Yes, testosterone 90 mg subQ weekly (since ~8/2018 = 3 years)  Shot day of the week? Thursday        Due for labs?  No  - Last labs 12/06/2021 (Monday) significant for T level 727, , total cholesterol 199, all others wnl  Refills of meds needed?  Yes    Gender affirmation is being sought in these other ways:  - Therapy  - Transmastetomy 03/2019    Headaches  - used to get migraines younger, did use Imitrex in the past but made him feel side effects  - seems to be related to tight neck muscles  - behind eyes or base of neck  - occasional vision changes or nausea, no vomiting   - Advil and Tylenol works  - did wake up this morning with headache, otherwise not typically present in the morning and does not wake him from sleep  - no neurologic deficits          Review of Systems:        General    Fat  "redistribution: YES    Weight change: no HEENT    Voice change: YES     Cardiovascular (CV)    Chest Pains: no    Shortness of breath: no Chest    Decreased exercise tolerance:  no    Breast changes/development: no     Gastrointestinal (GI)    Abdominal pain: no    Change in appetite: no Skin    Acne or oily skin: no    Change in hair: no     Genitourinary ()    Abnormal vaginal bleeding: no     Decreased spontaneous erections: not applicable    Change in libido: no    New sexual partners: no Musculoskeletal    Leg pain or swelling: no     Psychiatric (Psych)    Depression: \"good\", stable on sertraline    Anxiety/Panic: YES    Mood:  \"good\"                  Objective    /71   Pulse 71   Temp 98.6  F (37  C) (Oral)   Resp 16   Wt 69.5 kg (153 lb 3.2 oz)   SpO2 95%   BMI 25.49 kg/m    Body mass index is 25.49 kg/m .  Physical Exam  Constitutional:       General: He is not in acute distress.     Appearance: He is well-developed. He is not diaphoretic.   Cardiovascular:      Rate and Rhythm: Normal rate.   Pulmonary:      Effort: Pulmonary effort is normal. No respiratory distress.   Neurological:      General: No focal deficit present.      Mental Status: He is alert.      Cranial Nerves: No cranial nerve deficit.      Motor: No weakness.      Gait: Gait normal.   Psychiatric:         Mood and Affect: Mood normal.        Lab on 12/06/2021   Component Date Value Ref Range Status     Testosterone Total 12/06/2021 727  8 - 950 ng/dL Final     Hepatitis C Antibody 12/06/2021 Nonreactive  Nonreactive Final             "

## 2021-12-10 NOTE — PATIENT INSTRUCTIONS
Patient Education   Here is the plan from today's visit    Gender dysphoria in adult  No changes made today  - testosterone cypionate (DEPOTESTOSTERONE) 200 MG/ML injection; Inject 0.45 mLs (90 mg) Subcutaneous once a week  Dispense: 13 mL; Refill: 1    TODD (generalized anxiety disorder)  Moderate episode of recurrent major depressive disorder (H)  Refilled:   - sertraline (ZOLOFT) 100 MG tablet; Take 1 tablet (100 mg) by mouth daily  Dispense: 90 tablet; Refill: 3    Wheezing  - albuterol (PROAIR HFA/PROVENTIL HFA/VENTOLIN HFA) 108 (90 Base) MCG/ACT inhaler; Inhale 2 puffs into the lungs every 6 hours as needed for shortness of breath / dyspnea or wheezing  Dispense: 8.5 g; Refill: 1  - spacer (OPTICHAMBER VICENTE) holding chamber; Holding/spacer device to use with inhaler.  Dispense: 1 each; Refill: 0    Health care maintenance, Pap   I sent a new prescription for the vaginal estradiol that you can  2 weeks prior to your next visit with Dr. Arechiga for pap   - estradiol (VAGIFEM) 10 MCG TABS vaginal tablet; Place 1 tablet (10 mcg) vaginally daily for 14 days Prior to pap/procedure  Dispense: 14 tablet; Refill: 0    - INFLUENZA VACCINE IM > 6 MONTHS VALENT IIV4 (AFLURIA/FLUZONE) given today      Medical Concerns:  If you have urgent medical concerns please call [phone] at any time of the day.    Bree Gamze MD

## 2021-12-11 ASSESSMENT — ANXIETY QUESTIONNAIRES: GAD7 TOTAL SCORE: 8

## 2021-12-15 DIAGNOSIS — N95.2 VAGINAL ATROPHY: ICD-10-CM

## 2021-12-16 RX ORDER — ESTRADIOL 10 UG/1
TABLET VAGINAL
Qty: 48 TABLET | Refills: 1 | Status: SHIPPED | OUTPATIENT
Start: 2021-12-16 | End: 2024-01-09

## 2022-02-13 ENCOUNTER — HEALTH MAINTENANCE LETTER (OUTPATIENT)
Age: 32
End: 2022-02-13

## 2022-07-08 ENCOUNTER — MYC MEDICAL ADVICE (OUTPATIENT)
Dept: FAMILY MEDICINE | Facility: CLINIC | Age: 32
End: 2022-07-08

## 2022-07-08 DIAGNOSIS — F64.0 GENDER DYSPHORIA IN ADULT: ICD-10-CM

## 2022-07-08 RX ORDER — TESTOSTERONE CYPIONATE 200 MG/ML
90 INJECTION, SOLUTION INTRAMUSCULAR WEEKLY
Qty: 13 ML | Refills: 1 | Status: SHIPPED | OUTPATIENT
Start: 2022-07-08 | End: 2023-01-10

## 2022-07-28 NOTE — TELEPHONE ENCOUNTER
Writer received voice mail from Adonay at [facility] who was inquiring as to whether or not patient had surgery and what type of surgery was performed. Writer returned call, answered all questions and was told they had what they needed to process claim #[id number].  A[facility]     28-Jul-2022 09:30

## 2022-10-13 NOTE — TELEPHONE ENCOUNTER
[facility] phone call message- general phone call:    Reason for call: Carmen calling from [facility] to request information regarding patient's Short Term Disability claim.     Action Desired: Please return Carmen's call to provide the following: office visit dates, restrictions and limitations, diagnosis, estimated return to work date, any surgeries or hospital admission. Please reference claim number [id number].     Return call needed: Yes    OK to leave a message on voice mail? Yes    Advised patient response may take up to 2 business days: No    Primary language: English      needed? No    Call taken on November 15, 2018 at 10:52 AM by Ania Milner    Route to [facility] TRIAGE   Initial Size Of Lesion: 0.9

## 2022-10-16 ENCOUNTER — HEALTH MAINTENANCE LETTER (OUTPATIENT)
Age: 32
End: 2022-10-16

## 2023-01-10 DIAGNOSIS — Z51.81 ENCOUNTER FOR THERAPEUTIC DRUG MONITORING: Primary | ICD-10-CM

## 2023-01-10 DIAGNOSIS — F64.0 GENDER DYSPHORIA IN ADULT: ICD-10-CM

## 2023-01-10 RX ORDER — TESTOSTERONE CYPIONATE 200 MG/ML
90 INJECTION, SOLUTION INTRAMUSCULAR WEEKLY
Qty: 13 ML | Refills: 0 | Status: SHIPPED | OUTPATIENT
Start: 2023-01-10 | End: 2023-04-17

## 2023-01-12 DIAGNOSIS — F64.0 GENDER DYSPHORIA IN ADULT: ICD-10-CM

## 2023-01-12 RX ORDER — TESTOSTERONE CYPIONATE 200 MG/ML
INJECTION, SOLUTION INTRAMUSCULAR
Qty: 13 ML | OUTPATIENT
Start: 2023-01-12

## 2023-03-26 ENCOUNTER — HEALTH MAINTENANCE LETTER (OUTPATIENT)
Age: 33
End: 2023-03-26

## 2023-05-03 ENCOUNTER — TELEPHONE (OUTPATIENT)
Dept: FAMILY MEDICINE | Facility: CLINIC | Age: 33
End: 2023-05-03

## 2023-05-03 NOTE — TELEPHONE ENCOUNTER
Reason for call: Schedule appointment     Attempt to reach: 1st    Outcome:Left voicemail    Detailed message left? Yes    Please return call to [facility]     Clinic phone number [phone]    Called patient to schedule lab only visit as well as provider visit. LVM and will try again Friday.

## 2023-05-05 NOTE — TELEPHONE ENCOUNTER
Reason for call: Schedule appointment     Attempt to reach: 2nd    Outcome:Left voicemail    Detailed message left? Yes    Please return call to [facility]     Clinic phone number [phone]    Called patient to schedule lab and provider visit one week after lab. No answer, LVM and will send letter.

## 2023-05-18 ENCOUNTER — LAB (OUTPATIENT)
Dept: LAB | Facility: CLINIC | Age: 33
End: 2023-05-18
Payer: COMMERCIAL

## 2023-05-18 DIAGNOSIS — F64.0 GENDER DYSPHORIA IN ADULT: ICD-10-CM

## 2023-05-18 DIAGNOSIS — Z51.81 ENCOUNTER FOR THERAPEUTIC DRUG MONITORING: ICD-10-CM

## 2023-05-18 LAB
ALBUMIN SERPL BCG-MCNC: 4.4 G/DL (ref 3.5–5.2)
ALP SERPL-CCNC: 84 U/L (ref 35–129)
ALT SERPL W P-5'-P-CCNC: 27 U/L (ref 10–50)
AST SERPL W P-5'-P-CCNC: 26 U/L (ref 10–50)
BASOPHILS # BLD AUTO: 0 10E3/UL (ref 0–0.2)
BASOPHILS NFR BLD AUTO: 1 %
BILIRUB DIRECT SERPL-MCNC: <0.2 MG/DL (ref 0–0.3)
BILIRUB SERPL-MCNC: 0.4 MG/DL
CHOLEST SERPL-MCNC: 216 MG/DL
EOSINOPHIL # BLD AUTO: 0.5 10E3/UL (ref 0–0.7)
EOSINOPHIL NFR BLD AUTO: 9 %
ERYTHROCYTE [DISTWIDTH] IN BLOOD BY AUTOMATED COUNT: 12.6 % (ref 10–15)
FASTING STATUS PATIENT QL REPORTED: NORMAL
GLUCOSE SERPL-MCNC: 96 MG/DL (ref 70–99)
HCT VFR BLD AUTO: 46.1 % (ref 35–53)
HDLC SERPL-MCNC: 42 MG/DL
HGB BLD-MCNC: 15.3 G/DL (ref 11.7–17.7)
IMM GRANULOCYTES # BLD: 0 10E3/UL
IMM GRANULOCYTES NFR BLD: 0 %
LDLC SERPL CALC-MCNC: 144 MG/DL
LYMPHOCYTES # BLD AUTO: 2 10E3/UL (ref 0.8–5.3)
LYMPHOCYTES NFR BLD AUTO: 37 %
MCH RBC QN AUTO: 30.4 PG (ref 26.5–33)
MCHC RBC AUTO-ENTMCNC: 33.2 G/DL (ref 31.5–36.5)
MCV RBC AUTO: 92 FL (ref 78–100)
MONOCYTES # BLD AUTO: 0.4 10E3/UL (ref 0–1.3)
MONOCYTES NFR BLD AUTO: 7 %
NEUTROPHILS # BLD AUTO: 2.5 10E3/UL (ref 1.6–8.3)
NEUTROPHILS NFR BLD AUTO: 47 %
NONHDLC SERPL-MCNC: 174 MG/DL
PLATELET # BLD AUTO: 261 10E3/UL (ref 150–450)
PROT SERPL-MCNC: 7.2 G/DL (ref 6.4–8.3)
RBC # BLD AUTO: 5.04 10E6/UL (ref 3.8–5.9)
TRIGL SERPL-MCNC: 150 MG/DL
WBC # BLD AUTO: 5.4 10E3/UL (ref 4–11)

## 2023-05-18 PROCEDURE — 82947 ASSAY GLUCOSE BLOOD QUANT: CPT

## 2023-05-18 PROCEDURE — 84403 ASSAY OF TOTAL TESTOSTERONE: CPT

## 2023-05-18 PROCEDURE — 80076 HEPATIC FUNCTION PANEL: CPT

## 2023-05-18 PROCEDURE — 36415 COLL VENOUS BLD VENIPUNCTURE: CPT

## 2023-05-18 PROCEDURE — 85025 COMPLETE CBC W/AUTO DIFF WBC: CPT

## 2023-05-18 PROCEDURE — 80061 LIPID PANEL: CPT

## 2023-05-21 LAB — TESTOST SERPL-MCNC: 440 NG/DL (ref 8–950)

## 2024-01-08 ASSESSMENT — ENCOUNTER SYMPTOMS
JOINT SWELLING: 0
SHORTNESS OF BREATH: 0
NAUSEA: 0
WEAKNESS: 0
HEMATOCHEZIA: 0
DIZZINESS: 0
ABDOMINAL PAIN: 0
SORE THROAT: 0
HEARTBURN: 0
DYSURIA: 0
HEMATURIA: 0
MYALGIAS: 0
CONSTIPATION: 0
EYE PAIN: 0
NERVOUS/ANXIOUS: 0
FREQUENCY: 0
DIARRHEA: 0
HEADACHES: 0
ARTHRALGIAS: 0
BREAST MASS: 0
PARESTHESIAS: 0
CHILLS: 0
PALPITATIONS: 0
FEVER: 0
COUGH: 0

## 2024-01-09 ENCOUNTER — OFFICE VISIT (OUTPATIENT)
Dept: FAMILY MEDICINE | Facility: CLINIC | Age: 34
End: 2024-01-09
Payer: COMMERCIAL

## 2024-01-09 VITALS
DIASTOLIC BLOOD PRESSURE: 74 MMHG | SYSTOLIC BLOOD PRESSURE: 117 MMHG | WEIGHT: 143.7 LBS | OXYGEN SATURATION: 95 % | HEIGHT: 65 IN | RESPIRATION RATE: 18 BRPM | HEART RATE: 74 BPM | BODY MASS INDEX: 23.94 KG/M2

## 2024-01-09 DIAGNOSIS — Z12.4 CERVICAL CANCER SCREENING: ICD-10-CM

## 2024-01-09 DIAGNOSIS — Z00.00 ROUTINE GENERAL MEDICAL EXAMINATION AT A HEALTH CARE FACILITY: Primary | ICD-10-CM

## 2024-01-09 DIAGNOSIS — F64.0 GENDER DYSPHORIA IN ADULT: ICD-10-CM

## 2024-01-09 PROBLEM — F33.1 MODERATE EPISODE OF RECURRENT MAJOR DEPRESSIVE DISORDER (H): Status: RESOLVED | Noted: 2018-04-17 | Resolved: 2024-01-09

## 2024-01-09 LAB
ALBUMIN SERPL BCG-MCNC: 4.7 G/DL (ref 3.5–5.2)
ALP SERPL-CCNC: 82 U/L (ref 40–150)
ALT SERPL W P-5'-P-CCNC: 23 U/L (ref 0–70)
ANION GAP SERPL CALCULATED.3IONS-SCNC: 11 MMOL/L (ref 7–15)
AST SERPL W P-5'-P-CCNC: 29 U/L (ref 0–45)
BILIRUB SERPL-MCNC: 0.7 MG/DL
BUN SERPL-MCNC: 14.5 MG/DL (ref 6–20)
CALCIUM SERPL-MCNC: 9.8 MG/DL (ref 8.6–10)
CHLORIDE SERPL-SCNC: 102 MMOL/L (ref 98–107)
CREAT SERPL-MCNC: 0.94 MG/DL (ref 0.51–1.17)
DEPRECATED HCO3 PLAS-SCNC: 26 MMOL/L (ref 22–29)
EGFRCR SERPLBLD CKD-EPI 2021: >90 ML/MIN/1.73M2
GLUCOSE SERPL-MCNC: 90 MG/DL (ref 70–99)
POTASSIUM SERPL-SCNC: 4.1 MMOL/L (ref 3.4–5.3)
PROT SERPL-MCNC: 7.8 G/DL (ref 6.4–8.3)
SODIUM SERPL-SCNC: 139 MMOL/L (ref 135–145)

## 2024-01-09 PROCEDURE — 80053 COMPREHEN METABOLIC PANEL: CPT | Performed by: STUDENT IN AN ORGANIZED HEALTH CARE EDUCATION/TRAINING PROGRAM

## 2024-01-09 PROCEDURE — 99395 PREV VISIT EST AGE 18-39: CPT | Mod: GC | Performed by: STUDENT IN AN ORGANIZED HEALTH CARE EDUCATION/TRAINING PROGRAM

## 2024-01-09 PROCEDURE — 36415 COLL VENOUS BLD VENIPUNCTURE: CPT | Performed by: STUDENT IN AN ORGANIZED HEALTH CARE EDUCATION/TRAINING PROGRAM

## 2024-01-09 PROCEDURE — 87624 HPV HI-RISK TYP POOLED RSLT: CPT | Performed by: STUDENT IN AN ORGANIZED HEALTH CARE EDUCATION/TRAINING PROGRAM

## 2024-01-09 PROCEDURE — 84403 ASSAY OF TOTAL TESTOSTERONE: CPT | Mod: KX | Performed by: STUDENT IN AN ORGANIZED HEALTH CARE EDUCATION/TRAINING PROGRAM

## 2024-01-09 RX ORDER — TESTOSTERONE CYPIONATE 200 MG/ML
90 INJECTION, SOLUTION INTRAMUSCULAR WEEKLY
Qty: 12 ML | Refills: 3 | Status: SHIPPED | OUTPATIENT
Start: 2024-01-09 | End: 2024-07-31

## 2024-01-09 ASSESSMENT — ANXIETY QUESTIONNAIRES
3. WORRYING TOO MUCH ABOUT DIFFERENT THINGS: NOT AT ALL
GAD7 TOTAL SCORE: 0
IF YOU CHECKED OFF ANY PROBLEMS ON THIS QUESTIONNAIRE, HOW DIFFICULT HAVE THESE PROBLEMS MADE IT FOR YOU TO DO YOUR WORK, TAKE CARE OF THINGS AT HOME, OR GET ALONG WITH OTHER PEOPLE: NOT DIFFICULT AT ALL
2. NOT BEING ABLE TO STOP OR CONTROL WORRYING: NOT AT ALL
5. BEING SO RESTLESS THAT IT IS HARD TO SIT STILL: NOT AT ALL
GAD7 TOTAL SCORE: 0
7. FEELING AFRAID AS IF SOMETHING AWFUL MIGHT HAPPEN: NOT AT ALL
1. FEELING NERVOUS, ANXIOUS, OR ON EDGE: NOT AT ALL
6. BECOMING EASILY ANNOYED OR IRRITABLE: NOT AT ALL

## 2024-01-09 ASSESSMENT — ENCOUNTER SYMPTOMS
DYSURIA: 0
ABDOMINAL PAIN: 0
SORE THROAT: 0
NERVOUS/ANXIOUS: 0
FEVER: 0
COUGH: 0
PALPITATIONS: 0
CHILLS: 0
JOINT SWELLING: 0
SHORTNESS OF BREATH: 0
NAUSEA: 0
CONSTIPATION: 0
HEMATURIA: 0
HEADACHES: 0
DIZZINESS: 0
ARTHRALGIAS: 0
FREQUENCY: 0
DIARRHEA: 0
MYALGIAS: 0
EYE PAIN: 0
WEAKNESS: 0

## 2024-01-09 ASSESSMENT — PATIENT HEALTH QUESTIONNAIRE - PHQ9
SUM OF ALL RESPONSES TO PHQ QUESTIONS 1-9: 1
5. POOR APPETITE OR OVEREATING: NOT AT ALL

## 2024-01-09 NOTE — PROGRESS NOTES
SUBJECTIVE:   Mo is a 33 year old, presenting for the following:  Physical (Annual check up ) and Refill Request (Testosterone )        1/9/2024     8:07 AM   Additional Questions   Roomed by Oscar   Accompanied by self       Healthy Habits:     Getting at least 3 servings of Calcium per day:  NO    Bi-annual eye exam:  Yes    Dental care twice a year:  NO    Sleep apnea or symptoms of sleep apnea:  None    Diet:  Regular (no restrictions) and Breakfast skipped    Frequency of exercise:  2-3 days/week    Duration of exercise:  15-30 minutes    Taking medications regularly:  Yes    Medication side effects:  None    Additional concerns today:  No  Answers submitted by the patient for this visit:  Annual Preventive Visit (Submitted on 1/8/2024)  Chief Complaint: Annual Exam:  Blood in stool: No  heartburn: No  peripheral edema: No  mood changes: No  Skin sensation changes: No  pelvic pain: No  vaginal bleeding: No  vaginal discharge: No  tenderness: No  breast mass: No  breast discharge: No  impotence: No  penile discharge: No      Gender care   - subcutaneous inj - since around 2019  - no concerns with dose  - no breakthrough bleeding  - last shot was Saturday     Mental health  - therapy weekly   - off zoloft for a long time ago     Exercise/diet  - skateboards weekly, walks daily  - eats poorly   - both him and roommate work from home    Mild asthma with URI/COVId  - inhaler helps   - doesn't use otherwise - no issues with exercise or allergies    Offered HPV self-sampling cervical cancer screening option and patient Accept -patient collected.      Social History     Tobacco Use    Smoking status: Never    Smokeless tobacco: Never   Substance Use Topics    Alcohol use: Yes     Comment: occasionally             1/8/2024    11:11 PM   Alcohol Use   Prescreen: >3 drinks/day or >7 drinks/week? No     Working on cutting back on cannabis  Able to be sober when he wants to from it  Rare AM use    Reviewed orders with  "patient.  Reviewed health maintenance and updated orders accordingly - Yes    Breast Cancer Screening:  S/p top surgery - had preop mammo  Told there would be a need for screening again at some      Reviewed and updated as needed this visit by clinical staff   Tobacco  Allergies  Meds  Problems  Med Hx  Surg Hx  Fam Hx          Reviewed and updated as needed this visit by Provider   Tobacco  Allergies  Meds  Problems  Med Hx  Surg Hx  Fam Hx             Review of Systems   Constitutional:  Negative for chills and fever.   HENT:  Negative for congestion, ear pain, hearing loss and sore throat.    Eyes:  Negative for pain and visual disturbance.   Respiratory:  Negative for cough and shortness of breath.    Cardiovascular:  Negative for chest pain and palpitations.   Gastrointestinal:  Negative for abdominal pain, constipation, diarrhea and nausea.   Genitourinary:  Negative for dysuria, frequency, genital sores, hematuria and urgency.   Musculoskeletal:  Negative for arthralgias, joint swelling and myalgias.   Skin:  Negative for rash.   Neurological:  Negative for dizziness, weakness and headaches.   Psychiatric/Behavioral:  The patient is not nervous/anxious.         OBJECTIVE:   /74   Pulse 74   Resp 18   Ht 1.651 m (5' 5\")   Wt 65.2 kg (143 lb 11.2 oz)   SpO2 95%   BMI 23.91 kg/m    Physical Exam  GENERAL: healthy, alert and no distress  EYES: Eyes grossly normal to inspection, PERRL and conjunctivae and sclerae normal  HENT: ear canals and TM's normal, nose and mouth without ulcers or lesions  NECK: no adenopathy, no asymmetry, masses, or scars and thyroid normal to palpation  RESP: lungs clear to auscultation - no rales, rhonchi or wheezes  CV: regular rate and rhythm, normal S1 S2, no S3 or S4, no murmur, click or rub, no peripheral edema and peripheral pulses strong  ABDOMEN: soft, nontender, no hepatosplenomegaly, no masses and bowel sounds normal  MS: no gross musculoskeletal " "defects noted, no edema  SKIN: About 4 mm atypical nevus on right mid back with slight central darkening.  No other suspicious lesions or rashes  NEURO: Normal strength and tone, mentation intact and speech normal  PSYCH: mentation appears normal, affect normal/bright      ASSESSMENT/PLAN:       ICD-10-CM    1. Routine general medical examination at a health care facility  Z00.00       2. Gender dysphoria in adult  F64.0 Benign subcutaneous testosterone for years, no concerns today just overdue for follow-up.  On the higher dose at 90 mg weekly, discussed sending prescription now but adjusting down if midcycle T is quite high.      syringe, disposable, 1 ML MISC     testosterone cypionate (DEPOTESTOSTERONE) 200 MG/ML injection     Testosterone total     Comprehensive metabolic panel     Needle, Disp, 25G X 5/8\" MISC     Needle, Disp, 20G X 1\" MISC     Testosterone total     Comprehensive metabolic panel      3. Cervical cancer screening  Z12.4 Discussed option of HPV self swab, which he opted for.    High risk HPV vaginal self-swab          Declined COVID/flu vaccines today, will get next week at a local pharmacy.    COUNSELING:  Reviewed preventive health counseling, as reflected in patient instructions       Regular exercise       Healthy diet/nutrition       Alcohol Use       Safe sex practices/STD prevention        He reports that he has never smoked. He has never used smokeless tobacco.        Sara Luther MD  [facility]  "

## 2024-01-10 LAB — TESTOST SERPL-MCNC: 768 NG/DL (ref 8–950)

## 2024-01-11 LAB
HUMAN PAPILLOMA VIRUS 16 DNA: NEGATIVE
HUMAN PAPILLOMA VIRUS 18 DNA: NEGATIVE
HUMAN PAPILLOMA VIRUS FINAL DIAGNOSIS: ABNORMAL
HUMAN PAPILLOMA VIRUS OTHER HR: POSITIVE

## 2024-01-11 NOTE — PROGRESS NOTES
Preceptor Attestation:   Patient seen, evaluated and discussed with the resident. I have verified the content of the note, which accurately reflects my assessment of the patient and the plan of care.   Supervising Physician:  Hilaria Grant, DO

## 2024-01-12 ENCOUNTER — MYC MEDICAL ADVICE (OUTPATIENT)
Dept: FAMILY MEDICINE | Facility: CLINIC | Age: 34
End: 2024-01-12
Payer: COMMERCIAL

## 2024-01-12 DIAGNOSIS — N95.2 VAGINAL ATROPHY: Primary | ICD-10-CM

## 2024-01-12 PROBLEM — R87.811 VAGINAL HIGH RISK HPV DNA TEST POSITIVE: Status: ACTIVE | Noted: 2024-01-12

## 2024-01-12 RX ORDER — ESTRADIOL 10 UG/1
20 INSERT VAGINAL AT BEDTIME
Qty: 10 TABLET | Refills: 0 | Status: SHIPPED | OUTPATIENT
Start: 2024-01-12 | End: 2024-01-17

## 2024-01-19 ENCOUNTER — TELEPHONE (OUTPATIENT)
Dept: FAMILY MEDICINE | Facility: CLINIC | Age: 34
End: 2024-01-19
Payer: COMMERCIAL

## 2024-01-19 NOTE — TELEPHONE ENCOUNTER
----- Message from Sara Luther MD sent at 1/12/2024 12:08 PM CST -----  Thanks! I'll order vaginal estrogen to prep    ----- Message -----  From: Hilaria Vallejo RN  Sent: 1/12/2024   9:36 AM CST  To: Sara Luther MD    [facility] for Mo regarding scheduling a pap.

## 2024-01-19 NOTE — TELEPHONE ENCOUNTER
LVM regarding scheduling recommended follow up to recent testing (a pap to schedule) & that a prescription was sent to their pharmacy on file to prepare for it.  Asked to call back directly.  MyChart sent also.  Hilaria STERLING, TRINITYM, OB CC

## 2024-01-24 ENCOUNTER — TELEPHONE (OUTPATIENT)
Dept: FAMILY MEDICINE | Facility: CLINIC | Age: 34
End: 2024-01-24
Payer: COMMERCIAL

## 2024-01-24 NOTE — TELEPHONE ENCOUNTER
Called Mo to help schedule recommended pap.  Davdi'd with Dr. Arechiga on 2/5 @ 3pm.  Pt confirmed they receive apt reminders.  Recv'd Estrogen RX and will start on Sunday.  No further questions/concerns.  Hilaria Vallejo RN

## 2024-02-05 ENCOUNTER — OFFICE VISIT (OUTPATIENT)
Dept: FAMILY MEDICINE | Facility: CLINIC | Age: 34
End: 2024-02-05
Payer: COMMERCIAL

## 2024-02-05 ENCOUNTER — TELEPHONE (OUTPATIENT)
Dept: FAMILY MEDICINE | Facility: CLINIC | Age: 34
End: 2024-02-05

## 2024-02-05 VITALS
TEMPERATURE: 98.3 F | RESPIRATION RATE: 18 BRPM | SYSTOLIC BLOOD PRESSURE: 111 MMHG | HEART RATE: 98 BPM | OXYGEN SATURATION: 97 % | DIASTOLIC BLOOD PRESSURE: 77 MMHG

## 2024-02-05 DIAGNOSIS — Z11.3 SCREENING EXAMINATION FOR VENEREAL DISEASE: ICD-10-CM

## 2024-02-05 DIAGNOSIS — R87.811 VAGINAL HIGH RISK HPV DNA TEST POSITIVE: Primary | ICD-10-CM

## 2024-02-05 PROCEDURE — 88175 CYTOPATH C/V AUTO FLUID REDO: CPT | Mod: KX | Performed by: STUDENT IN AN ORGANIZED HEALTH CARE EDUCATION/TRAINING PROGRAM

## 2024-02-05 PROCEDURE — 87591 N.GONORRHOEAE DNA AMP PROB: CPT | Performed by: STUDENT IN AN ORGANIZED HEALTH CARE EDUCATION/TRAINING PROGRAM

## 2024-02-05 PROCEDURE — 87491 CHLMYD TRACH DNA AMP PROBE: CPT | Performed by: STUDENT IN AN ORGANIZED HEALTH CARE EDUCATION/TRAINING PROGRAM

## 2024-02-05 PROCEDURE — 87624 HPV HI-RISK TYP POOLED RSLT: CPT | Mod: KX | Performed by: STUDENT IN AN ORGANIZED HEALTH CARE EDUCATION/TRAINING PROGRAM

## 2024-02-05 PROCEDURE — 99213 OFFICE O/P EST LOW 20 MIN: CPT | Mod: KX | Performed by: STUDENT IN AN ORGANIZED HEALTH CARE EDUCATION/TRAINING PROGRAM

## 2024-02-05 ASSESSMENT — PAIN SCALES - GENERAL: PAINLEVEL: NO PAIN (0)

## 2024-02-05 NOTE — PROGRESS NOTES
Assessment & Plan     Mo was seen today for repeat pap smear.    Diagnoses and all orders for this visit:    Vaginal high risk HPV DNA test positive  -     Pap screen with HPV - recommended age 30 - 65 years  -     HPV Hold (Lab Only)  -     HPV High Risk Types DNA Cervical    Screening examination for venereal disease  -     Vaginal-Chlamydia trachomatis/Neisseria gonorrhoeae by PCR    PAP with NIL. HPV positive continued. Per [facility] repeat screening cytology in 1 year.     G/c screening negative.     Ordering of each unique test      Return in about 1 year (around 2/5/2025) for repeat cervical cancer screening, other care per PCP.    Mateo Hassan is a 34 year old, presenting for the following health issues:  Repeat Pap Smear (Self swab done and asked to come for pap)        2/5/2024     2:53 PM   Additional Questions   Roomed by Mahi   Accompanied by Avelino     HPI     HPV positive, other HR on self swab. PAP indicated.   Took vaginal estrogen        Objective    /77   Pulse 98   Temp 98.3  F (36.8  C)   Resp 18   SpO2 97%   There is no height or weight on file to calculate BMI.  Physical Exam  Exam conducted with a chaperone present (Guille).   Constitutional:       General: He is not in acute distress.     Appearance: He is well-developed.   HENT:      Head: Normocephalic and atraumatic.   Eyes:      General: No scleral icterus.     Extraocular Movements: Extraocular movements intact.   Cardiovascular:      Rate and Rhythm: Normal rate.      Heart sounds: Normal heart sounds.   Pulmonary:      Effort: Pulmonary effort is normal. No respiratory distress.   Genitourinary:     Exam position: Lithotomy position.      Pubic Area: No rash.       Labia:         Right: No lesion.         Left: No lesion.       Urethra: No urethral swelling or urethral lesion.      Vagina: No vaginal discharge or tenderness.      Cervix: Normal. No discharge, friability or lesion.   Neurological:      General: No  focal deficit present.      Mental Status: He is alert and oriented to person, place, and time.   Psychiatric:         Thought Content: Thought content normal.          Results for orders placed or performed in visit on 02/05/24   HPV High Risk Types DNA Cervical     Status: Abnormal   Result Value Ref Range    Other HR HPV Positive (A) Negative    HPV16 DNA Negative Negative    HPV18 DNA Negative Negative    FINAL DIAGNOSIS       This patient's sample is positive for other HR HPV DNA (types 31, 33, 35, 39, 45, 51, 52, 56, 58, 59, 66 or 68), not HPV 16 or HPV 18 DNA. This result requires clinical correlation with concurrent cytology findings.      This test was developed and its performance characteristics determined by the [facility]. It has not been cleared or approved by the FDA. The laboratory is regulated under CLIA as qualified to perform high-complexity testing. This test is used for clinical purposes. It should not be regarded as investigational or for research.    METHODOLOGY: The Roche Jonh 4800 system uses automated extraction, simultaneous amplification of HPV (L1 region) and beta-globin, followed by real time detection of fluorescent labeled HPV and beta globin using specific oligonucleotide probes. The test specifically identifies types HPV 16 DNA and HPV 18 DNA while concurrently detecting the rest of the high risk types (31, 33, 35, 39, 45, 51, 52, 56, 58, 59, 66 or 68).    COMMENTS: This test is not intended for use as a screening device for woman under age 30 with normal cervical cytology. Results should be correlated with cytologic and histologic findings. Close clinical followup is recommended.       Pap screen with HPV - recommended age 30 - 65 years     Status: None   Result Value Ref Range    Interpretation        Negative for Intraepithelial Lesion or Malignancy (NILM)    Comment         Papanicolaou Test Limitations:  Cervical  cytology is a screening test with limited sensitivity, and regular screening is critical for cancer prevention.  Pap tests are primarily effective for the diagnosis/prevention of squamous cell carcinoma, not adenocarcinoma or other cancers.        Specimen Adequacy       Satisfactory for evaluation, endocervical/transformation zone component present    Clinical Information       irregular bleeding[on T for 6 years, too vaginal estrogen      Reflex Testing Yes regardless of result     Previous Abnormal?       No[HPV screen positive      Performing Labs       The technical component of this testing was completed at [facility]     Vaginal-Chlamydia trachomatis/Neisseria gonorrhoeae by PCR     Status: Normal    Specimen: Vagina; Swab   Result Value Ref Range    Chlamydia Trachomatis Negative Negative    Neisseria gonorrhoeae Negative Negative           Signed Electronically by: Tomas Lopes,

## 2024-02-05 NOTE — TELEPHONE ENCOUNTER
[facility] phone call message- general phone call:    Reason for call: The patient had an appt with Dr Arechiga today at 3 for a PAP but the patient states he can not reschedule b/c he had to take a bunch of meds in preparation for this appt and needs to be seen today. Would like a call back.    Return call needed: Yes    OK to leave a message on voice mail? Yes    Primary language: English      needed? No    Call taken on February 5, 2024 at 12:09 PM by Justine Vallejo

## 2024-02-06 LAB
C TRACH DNA SPEC QL PROBE+SIG AMP: NEGATIVE
N GONORRHOEA DNA SPEC QL NAA+PROBE: NEGATIVE

## 2024-02-07 LAB
BKR LAB AP GYN ADEQUACY: NORMAL
BKR LAB AP GYN INTERPRETATION: NORMAL
BKR LAB AP HPV REFLEX: NORMAL
BKR LAB AP PREVIOUS ABNORMAL: NORMAL
PATH REPORT.COMMENTS IMP SPEC: NORMAL
PATH REPORT.COMMENTS IMP SPEC: NORMAL
PATH REPORT.RELEVANT HX SPEC: NORMAL

## 2024-07-31 ENCOUNTER — MYC REFILL (OUTPATIENT)
Dept: FAMILY MEDICINE | Facility: CLINIC | Age: 34
End: 2024-07-31
Payer: COMMERCIAL

## 2024-07-31 DIAGNOSIS — F64.0 GENDER DYSPHORIA IN ADULT: ICD-10-CM

## 2024-07-31 RX ORDER — TESTOSTERONE CYPIONATE 200 MG/ML
90 INJECTION, SOLUTION INTRAMUSCULAR WEEKLY
Qty: 12 ML | Refills: 3 | Status: SHIPPED | OUTPATIENT
Start: 2024-07-31

## 2024-07-31 NOTE — TELEPHONE ENCOUNTER
"Request for medication refill:  Needle, Disp, 20G X 1\" MISC     Needle, Disp, 25G X 5/8\" MISC     syringe, disposable, 1 ML MISC     testosterone cypionate (DEPOTESTOSTERONE) 200 MG/ML injection     Providers if patient needs an appointment and you are willing to give a one month supply please refill for one month and  send a letter/MyChart using \".SMILLIMITEDREFILL\" .smillimited and route chart to \"P [facility] \" (Giving one month refill in non controlled medications is strongly recommended before denial)    If refill has been denied, meaning absolutely no refills without visit, please complete the smart phrase \".smirxrefuse\" and route it to the \"P [facility] MED REFILLS\"  pool to inform the patient and the pharmacy.    Ruby Love, CMA      "

## 2024-11-04 ENCOUNTER — MYC REFILL (OUTPATIENT)
Dept: FAMILY MEDICINE | Facility: CLINIC | Age: 34
End: 2024-11-04
Payer: COMMERCIAL

## 2024-11-04 DIAGNOSIS — R06.2 WHEEZING: ICD-10-CM

## 2024-11-04 RX ORDER — ALBUTEROL SULFATE 90 UG/1
2 INHALANT RESPIRATORY (INHALATION) EVERY 6 HOURS PRN
Qty: 8.5 G | Refills: 11 | Status: SHIPPED | OUTPATIENT
Start: 2024-11-04

## 2024-11-04 NOTE — TELEPHONE ENCOUNTER
"Request for medication refill:  albuterol (PROAIR HFA/PROVENTIL HFA/VENTOLIN HFA) 108 (90 Base) MCG/ACT inhaler     Providers if patient needs an appointment and you are willing to give a one month supply please refill for one month and  send a letter/MyChart using \".SMILLIMITEDREFILL\" .smillimited and route chart to \"P [facility] \" (Giving one month refill in non controlled medications is strongly recommended before denial)    If refill has been denied, meaning absolutely no refills without visit, please complete the smart phrase \".smirxrefuse\" and route it to the \"P [facility] MED REFILLS\"  pool to inform the patient and the pharmacy.    Ruby Love, CMA      "

## 2024-12-10 ENCOUNTER — PATIENT OUTREACH (OUTPATIENT)
Dept: CARE COORDINATION | Facility: CLINIC | Age: 34
End: 2024-12-10
Payer: COMMERCIAL

## 2024-12-24 ENCOUNTER — PATIENT OUTREACH (OUTPATIENT)
Dept: CARE COORDINATION | Facility: CLINIC | Age: 34
End: 2024-12-24
Payer: COMMERCIAL

## 2025-01-06 ENCOUNTER — MYC REFILL (OUTPATIENT)
Dept: FAMILY MEDICINE | Facility: CLINIC | Age: 35
End: 2025-01-06
Payer: COMMERCIAL

## 2025-01-06 DIAGNOSIS — F64.0 GENDER DYSPHORIA IN ADULT: ICD-10-CM

## 2025-01-06 DIAGNOSIS — R06.2 WHEEZING: ICD-10-CM

## 2025-01-07 RX ORDER — TESTOSTERONE CYPIONATE 200 MG/ML
90 INJECTION, SOLUTION INTRAMUSCULAR WEEKLY
Qty: 13 ML | Refills: 0 | Status: SHIPPED | OUTPATIENT
Start: 2025-01-07

## 2025-01-07 RX ORDER — ALBUTEROL SULFATE 90 UG/1
2 INHALANT RESPIRATORY (INHALATION) EVERY 6 HOURS PRN
Qty: 8.5 G | Refills: 11 | Status: SHIPPED | OUTPATIENT
Start: 2025-01-07

## 2025-01-07 NOTE — TELEPHONE ENCOUNTER
"    Request for medication refill:    Providers if patient needs an appointment and you are willing to give a one month supply please refill for one month and  send a letter/MyChart using \".SMILLIMITEDREFILL\" .smillimited and route chart to \"P [facility] \" (Giving one month refill in non controlled medications is strongly recommended before denial)    If refill has been denied, meaning absolutely no refills without visit, please complete the smart phrase \".smirxrefuse\" and route it to the \"P [facility] MED REFILLS\"  pool to inform the patient and the pharmacy.    Mahi Valdes RN      "

## 2025-02-19 ENCOUNTER — TELEPHONE (OUTPATIENT)
Dept: FAMILY MEDICINE | Facility: CLINIC | Age: 35
End: 2025-02-19
Payer: COMMERCIAL

## 2025-02-19 DIAGNOSIS — R87.811 VAGINAL HIGH RISK HPV DNA TEST POSITIVE: Primary | ICD-10-CM

## 2025-02-19 NOTE — LETTER
February 19, 2025      Mo Lopez  [address]   [place] [address]    Dear ,      At [facility], your health and wellness is our primary concern. That is why we are following up on an abnormal pap from 1/2024, which was reported as HPV+. Your provider had recommended that you have a Pap smear and HPV test completed by 1/2025. Our records do not show that this has been done.    It is important to complete the follow up that your provider has suggested for you to ensure that there are no worsening changes which may, over time, develop into cancer.      Please contact our office at  [phone] to schedule an appointment for a Pap smear and HPV test at your earliest convenience. If you have questions or concerns, please call the clinic and we will be happy to assist you.    If you have completed the tests outside of [facility], please have the results forwarded to our office. We will update the chart for your primary Physician to review before your next annual physical.     Thank you for choosing [facility]!    Sincerely,      Your [facility] Care Team

## 2025-02-19 NOTE — TELEPHONE ENCOUNTER
Unable to LVM for Mo regarding follow up screening that is due-mailbox full.  Letter sent.  Nxt attempt in 1month.  Hilaria STERLING CNM, [facility]

## 2025-02-22 ENCOUNTER — HEALTH MAINTENANCE LETTER (OUTPATIENT)
Age: 35
End: 2025-02-22

## (undated) DEVICE — SPONGE LAP 18X18" X8435

## (undated) DEVICE — GLOVE PROTEXIS POWDER FREE 7.5 ORTHOPEDIC 2D73ET75

## (undated) DEVICE — SU PLAIN FAST ABSORB 5-0 PC-1 18" 1915G

## (undated) DEVICE — SUCTION TIP YANKAUER STR K87

## (undated) DEVICE — DRSG TEGADERM 4X4 3/4" 1626W

## (undated) DEVICE — ESU ELEC BLADE 2.75" COATED/INSULATED E1455

## (undated) DEVICE — SU PDS II 2-0 SH 27" Z317H

## (undated) DEVICE — SYR BULB IRRIG 50ML LATEX FREE 0035280

## (undated) DEVICE — DRSG ABDOMINAL 07 1/2X8" 7197D

## (undated) DEVICE — ADH LIQUID MASTISOL TOPICAL VIAL 2-3ML 0523-48

## (undated) DEVICE — PAD CHUX UNDERPAD 30X36" P3036C

## (undated) DEVICE — BLADE KNIFE SURG 15 371115

## (undated) DEVICE — SU PDS II 3-0 PS-2 18" Z497G

## (undated) DEVICE — ESU GROUND PAD ADULT W/CORD E7507

## (undated) DEVICE — HEMOSTAT ABSORBABLE AGENT ARISTA 3GM POWDER SM0002-USA

## (undated) DEVICE — PREP CHLORAPREP 26ML TINTED ORANGE  260815

## (undated) DEVICE — STPL SKIN 35W APPOSE 8886803712

## (undated) DEVICE — ESU PENCIL SMOKE EVAC W/ROCKER SWITCH 0703-047-000

## (undated) DEVICE — NDL EPIDURAL TUOHY 20GA 3.5" 405028

## (undated) DEVICE — SU CHROMIC 3-0 SH 27" G122H

## (undated) DEVICE — SU DERMABOND PRINEO CLR602US

## (undated) DEVICE — DECANTER BAG 2002S

## (undated) DEVICE — SU STRATAFIX MONOCRYL 3-0 SPIRAL PS-2 45CM SXMP1B107

## (undated) DEVICE — DRSG DRAIN 2X2" 7087

## (undated) DEVICE — LINEN TOWEL PACK X5 5464

## (undated) DEVICE — DRAPE TIBURON TOP SHEET 100X60" 29352

## (undated) DEVICE — DRAIN JACKSON PRATT RESERVOIR 100ML SU130-1305

## (undated) DEVICE — SUCTION MANIFOLD NEPTUNE 2 SYS 1 PORT 702-025-000

## (undated) DEVICE — SOL NACL 0.9% INJ 1000ML BAG 2B1324X

## (undated) DEVICE — PEN MARKING SKIN TYCO DEVON DUAL TIP 31145868

## (undated) DEVICE — BNDG ELASTIC 6"X5YDS STERILE 6611-6S

## (undated) DEVICE — PACK ENT MINOR CUSTOM ASC

## (undated) DEVICE — DRSG COTTON BALL 6PK LCB62

## (undated) DEVICE — DRSG XEROFORM 5X9" 8884431605

## (undated) DEVICE — SOL NACL 0.9% IRRIG 1000ML BOTTLE 2F7124

## (undated) DEVICE — SU MONOCRYL 2-0 SH 27" UND Y417H

## (undated) DEVICE — GLOVE PROTEXIS BLUE W/NEU-THERA 7.5  2D73EB75

## (undated) DEVICE — SYR 20ML LL W/O NDL 302830

## (undated) DEVICE — SU ETHILON 3-0 PS-1 18" 1663H

## (undated) DEVICE — DRAIN JACKSON PRATT CHANNEL 15FR ROUND HUBLESS SIL JP-2228

## (undated) DEVICE — DRAPE IOBAN INCISE 23X17" 6650EZ

## (undated) RX ORDER — CLINDAMYCIN PHOSPHATE 900 MG/50ML
INJECTION, SOLUTION INTRAVENOUS
Status: DISPENSED
Start: 2019-03-29

## (undated) RX ORDER — OXYCODONE HYDROCHLORIDE 5 MG/1
TABLET ORAL
Status: DISPENSED
Start: 2019-03-29

## (undated) RX ORDER — FENTANYL CITRATE 50 UG/ML
INJECTION, SOLUTION INTRAMUSCULAR; INTRAVENOUS
Status: DISPENSED
Start: 2019-03-29

## (undated) RX ORDER — ONDANSETRON 2 MG/ML
INJECTION INTRAMUSCULAR; INTRAVENOUS
Status: DISPENSED
Start: 2019-03-29

## (undated) RX ORDER — GABAPENTIN 300 MG/1
CAPSULE ORAL
Status: DISPENSED
Start: 2019-03-29

## (undated) RX ORDER — PROPOFOL 10 MG/ML
INJECTION, EMULSION INTRAVENOUS
Status: DISPENSED
Start: 2019-03-29

## (undated) RX ORDER — ACETAMINOPHEN 325 MG/1
TABLET ORAL
Status: DISPENSED
Start: 2019-03-29

## (undated) RX ORDER — ONDANSETRON 4 MG/1
TABLET, ORALLY DISINTEGRATING ORAL
Status: DISPENSED
Start: 2019-03-29

## (undated) RX ORDER — HYDROMORPHONE HYDROCHLORIDE 1 MG/ML
INJECTION, SOLUTION INTRAMUSCULAR; INTRAVENOUS; SUBCUTANEOUS
Status: DISPENSED
Start: 2019-03-29

## (undated) RX ORDER — LIDOCAINE HYDROCHLORIDE 20 MG/ML
INJECTION, SOLUTION EPIDURAL; INFILTRATION; INTRACAUDAL; PERINEURAL
Status: DISPENSED
Start: 2019-03-29

## (undated) RX ORDER — EPHEDRINE SULFATE 50 MG/ML
INJECTION, SOLUTION INTRAMUSCULAR; INTRAVENOUS; SUBCUTANEOUS
Status: DISPENSED
Start: 2019-03-29

## (undated) RX ORDER — DEXAMETHASONE SODIUM PHOSPHATE 4 MG/ML
INJECTION, SOLUTION INTRA-ARTICULAR; INTRALESIONAL; INTRAMUSCULAR; INTRAVENOUS; SOFT TISSUE
Status: DISPENSED
Start: 2019-03-29